# Patient Record
Sex: MALE | HISPANIC OR LATINO | Employment: OTHER | URBAN - NONMETROPOLITAN AREA
[De-identification: names, ages, dates, MRNs, and addresses within clinical notes are randomized per-mention and may not be internally consistent; named-entity substitution may affect disease eponyms.]

---

## 2022-08-22 ENCOUNTER — HOSPITAL ENCOUNTER (INPATIENT)
Facility: HOSPITAL | Age: 57
LOS: 1 days | Discharge: HOME | DRG: 092 | End: 2022-08-24
Attending: EMERGENCY MEDICINE | Admitting: FAMILY MEDICINE
Payer: COMMERCIAL

## 2022-08-22 ENCOUNTER — APPOINTMENT (OUTPATIENT)
Dept: CT IMAGING | Facility: HOSPITAL | Age: 57
DRG: 092 | End: 2022-08-22
Payer: COMMERCIAL

## 2022-08-22 ENCOUNTER — APPOINTMENT (EMERGENCY)
Dept: CT IMAGING | Facility: HOSPITAL | Age: 57
DRG: 092 | End: 2022-08-22
Payer: COMMERCIAL

## 2022-08-22 ENCOUNTER — APPOINTMENT (OUTPATIENT)
Dept: MRI IMAGING | Facility: HOSPITAL | Age: 57
DRG: 092 | End: 2022-08-22
Payer: COMMERCIAL

## 2022-08-22 DIAGNOSIS — I60.9 SAH (SUBARACHNOID HEMORRHAGE) (HCC): ICD-10-CM

## 2022-08-22 DIAGNOSIS — R42 DIZZINESS: ICD-10-CM

## 2022-08-22 DIAGNOSIS — G93.9 CEREBELLAR LESION: ICD-10-CM

## 2022-08-22 DIAGNOSIS — N18.9 CHRONIC KIDNEY DISEASE: ICD-10-CM

## 2022-08-22 DIAGNOSIS — R73.9 HYPERGLYCEMIA: Primary | ICD-10-CM

## 2022-08-22 LAB
ALBUMIN SERPL BCP-MCNC: 3.5 G/DL (ref 3.5–5)
ALP SERPL-CCNC: 107 U/L (ref 46–116)
ALT SERPL W P-5'-P-CCNC: 23 U/L (ref 12–78)
ANION GAP SERPL CALCULATED.3IONS-SCNC: 12 MMOL/L (ref 4–13)
APTT PPP: 27 SECONDS (ref 23–37)
AST SERPL W P-5'-P-CCNC: 13 U/L (ref 5–45)
BASE EX.OXY STD BLDV CALC-SCNC: 97 % (ref 60–80)
BASE EXCESS BLDV CALC-SCNC: -1.4 MMOL/L
BASOPHILS # BLD AUTO: 0.08 THOUSANDS/ΜL (ref 0–0.1)
BASOPHILS NFR BLD AUTO: 1 % (ref 0–1)
BETA-HYDROXYBUTYRATE: 0.1 MMOL/L
BILIRUB SERPL-MCNC: 0.35 MG/DL (ref 0.2–1)
BILIRUB UR QL STRIP: NEGATIVE
BUN SERPL-MCNC: 22 MG/DL (ref 5–25)
CALCIUM SERPL-MCNC: 9.1 MG/DL (ref 8.3–10.1)
CARDIAC TROPONIN I PNL SERPL HS: 10 NG/L
CARDIAC TROPONIN I PNL SERPL HS: 15 NG/L (ref 8–18)
CHLORIDE SERPL-SCNC: 99 MMOL/L (ref 96–108)
CK SERPL-CCNC: 76 U/L (ref 39–308)
CLARITY UR: CLEAR
CO2 SERPL-SCNC: 24 MMOL/L (ref 21–32)
COLOR UR: YELLOW
CREAT SERPL-MCNC: 1.45 MG/DL (ref 0.6–1.3)
EOSINOPHIL # BLD AUTO: 0.69 THOUSAND/ΜL (ref 0–0.61)
EOSINOPHIL NFR BLD AUTO: 5 % (ref 0–6)
ERYTHROCYTE [DISTWIDTH] IN BLOOD BY AUTOMATED COUNT: 15.9 % (ref 11.6–15.1)
GFR SERPL CREATININE-BSD FRML MDRD: 53 ML/MIN/1.73SQ M
GLUCOSE SERPL-MCNC: 137 MG/DL (ref 65–140)
GLUCOSE SERPL-MCNC: 148 MG/DL (ref 65–140)
GLUCOSE SERPL-MCNC: 304 MG/DL (ref 65–140)
GLUCOSE SERPL-MCNC: 342 MG/DL (ref 65–140)
GLUCOSE UR STRIP-MCNC: ABNORMAL MG/DL
HCO3 BLDV-SCNC: 21.6 MMOL/L (ref 24–30)
HCT VFR BLD AUTO: 40.6 % (ref 36.5–49.3)
HGB BLD-MCNC: 12.5 G/DL (ref 12–17)
HGB UR QL STRIP.AUTO: NEGATIVE
IMM GRANULOCYTES # BLD AUTO: 0.08 THOUSAND/UL (ref 0–0.2)
IMM GRANULOCYTES NFR BLD AUTO: 1 % (ref 0–2)
INR PPP: 0.96 (ref 0.84–1.19)
KETONES UR STRIP-MCNC: NEGATIVE MG/DL
LEUKOCYTE ESTERASE UR QL STRIP: NEGATIVE
LYMPHOCYTES # BLD AUTO: 2.02 THOUSANDS/ΜL (ref 0.6–4.47)
LYMPHOCYTES NFR BLD AUTO: 14 % (ref 14–44)
MAGNESIUM SERPL-MCNC: 1.6 MG/DL (ref 1.6–2.6)
MCH RBC QN AUTO: 24.1 PG (ref 26.8–34.3)
MCHC RBC AUTO-ENTMCNC: 30.8 G/DL (ref 31.4–37.4)
MCV RBC AUTO: 78 FL (ref 82–98)
MONOCYTES # BLD AUTO: 0.92 THOUSAND/ΜL (ref 0.17–1.22)
MONOCYTES NFR BLD AUTO: 6 % (ref 4–12)
NEUTROPHILS # BLD AUTO: 10.81 THOUSANDS/ΜL (ref 1.85–7.62)
NEUTS SEG NFR BLD AUTO: 73 % (ref 43–75)
NITRITE UR QL STRIP: NEGATIVE
NRBC BLD AUTO-RTO: 0 /100 WBCS
NT-PROBNP SERPL-MCNC: 127 PG/ML
O2 CT BLDV-SCNC: 17.7 ML/DL
PCO2 BLDV: 31.3 MM HG (ref 42–50)
PH BLDV: 7.46 [PH] (ref 7.3–7.4)
PH UR STRIP.AUTO: 5.5 [PH]
PLATELET # BLD AUTO: 313 THOUSANDS/UL (ref 149–390)
PMV BLD AUTO: 9.6 FL (ref 8.9–12.7)
PO2 BLDV: 154.3 MM HG (ref 35–45)
POTASSIUM SERPL-SCNC: 3.6 MMOL/L (ref 3.5–5.3)
PROT SERPL-MCNC: 7.4 G/DL (ref 6.4–8.4)
PROT UR STRIP-MCNC: NEGATIVE MG/DL
PROTHROMBIN TIME: 12.9 SECONDS (ref 11.6–14.5)
RBC # BLD AUTO: 5.19 MILLION/UL (ref 3.88–5.62)
SODIUM SERPL-SCNC: 135 MMOL/L (ref 135–147)
SP GR UR STRIP.AUTO: 1.02 (ref 1–1.03)
TSH SERPL DL<=0.05 MIU/L-ACNC: 0.55 UIU/ML (ref 0.45–4.5)
UROBILINOGEN UR QL STRIP.AUTO: 0.2 E.U./DL
VIT B12 SERPL-MCNC: 391 PG/ML (ref 100–900)
WBC # BLD AUTO: 14.6 THOUSAND/UL (ref 4.31–10.16)

## 2022-08-22 PROCEDURE — 84484 ASSAY OF TROPONIN QUANT: CPT | Performed by: FAMILY MEDICINE

## 2022-08-22 PROCEDURE — 99220 PR INITIAL OBSERVATION CARE/DAY 70 MINUTES: CPT | Performed by: FAMILY MEDICINE

## 2022-08-22 PROCEDURE — 82010 KETONE BODYS QUAN: CPT | Performed by: EMERGENCY MEDICINE

## 2022-08-22 PROCEDURE — A9585 GADOBUTROL INJECTION: HCPCS | Performed by: FAMILY MEDICINE

## 2022-08-22 PROCEDURE — 83880 ASSAY OF NATRIURETIC PEPTIDE: CPT | Performed by: EMERGENCY MEDICINE

## 2022-08-22 PROCEDURE — 70553 MRI BRAIN STEM W/O & W/DYE: CPT

## 2022-08-22 PROCEDURE — 99285 EMERGENCY DEPT VISIT HI MDM: CPT

## 2022-08-22 PROCEDURE — 82805 BLOOD GASES W/O2 SATURATION: CPT | Performed by: EMERGENCY MEDICINE

## 2022-08-22 PROCEDURE — 82550 ASSAY OF CK (CPK): CPT | Performed by: EMERGENCY MEDICINE

## 2022-08-22 PROCEDURE — 82948 REAGENT STRIP/BLOOD GLUCOSE: CPT

## 2022-08-22 PROCEDURE — 84443 ASSAY THYROID STIM HORMONE: CPT | Performed by: EMERGENCY MEDICINE

## 2022-08-22 PROCEDURE — 36415 COLL VENOUS BLD VENIPUNCTURE: CPT | Performed by: EMERGENCY MEDICINE

## 2022-08-22 PROCEDURE — 96360 HYDRATION IV INFUSION INIT: CPT

## 2022-08-22 PROCEDURE — 85025 COMPLETE CBC W/AUTO DIFF WBC: CPT | Performed by: EMERGENCY MEDICINE

## 2022-08-22 PROCEDURE — 80053 COMPREHEN METABOLIC PANEL: CPT | Performed by: EMERGENCY MEDICINE

## 2022-08-22 PROCEDURE — 99285 EMERGENCY DEPT VISIT HI MDM: CPT | Performed by: EMERGENCY MEDICINE

## 2022-08-22 PROCEDURE — 83735 ASSAY OF MAGNESIUM: CPT | Performed by: EMERGENCY MEDICINE

## 2022-08-22 PROCEDURE — 93005 ELECTROCARDIOGRAM TRACING: CPT

## 2022-08-22 PROCEDURE — 96361 HYDRATE IV INFUSION ADD-ON: CPT

## 2022-08-22 PROCEDURE — 82607 VITAMIN B-12: CPT | Performed by: PHYSICIAN ASSISTANT

## 2022-08-22 PROCEDURE — 70450 CT HEAD/BRAIN W/O DYE: CPT

## 2022-08-22 PROCEDURE — 85730 THROMBOPLASTIN TIME PARTIAL: CPT | Performed by: EMERGENCY MEDICINE

## 2022-08-22 PROCEDURE — 85610 PROTHROMBIN TIME: CPT | Performed by: EMERGENCY MEDICINE

## 2022-08-22 PROCEDURE — 84484 ASSAY OF TROPONIN QUANT: CPT | Performed by: EMERGENCY MEDICINE

## 2022-08-22 PROCEDURE — 70496 CT ANGIOGRAPHY HEAD: CPT

## 2022-08-22 PROCEDURE — 81003 URINALYSIS AUTO W/O SCOPE: CPT | Performed by: FAMILY MEDICINE

## 2022-08-22 RX ORDER — INSULIN LISPRO 100 [IU]/ML
1-6 INJECTION, SOLUTION INTRAVENOUS; SUBCUTANEOUS
Status: DISCONTINUED | OUTPATIENT
Start: 2022-08-22 | End: 2022-08-22

## 2022-08-22 RX ORDER — SENNOSIDES 8.6 MG
1 TABLET ORAL
Status: DISCONTINUED | OUTPATIENT
Start: 2022-08-22 | End: 2022-08-24 | Stop reason: HOSPADM

## 2022-08-22 RX ORDER — CALCIUM CARBONATE 200(500)MG
1000 TABLET,CHEWABLE ORAL DAILY PRN
Status: DISCONTINUED | OUTPATIENT
Start: 2022-08-22 | End: 2022-08-24 | Stop reason: HOSPADM

## 2022-08-22 RX ORDER — INSULIN GLARGINE 100 [IU]/ML
50 INJECTION, SOLUTION SUBCUTANEOUS
Status: DISCONTINUED | OUTPATIENT
Start: 2022-08-23 | End: 2022-08-24 | Stop reason: HOSPADM

## 2022-08-22 RX ORDER — HEPARIN SODIUM 5000 [USP'U]/ML
5000 INJECTION, SOLUTION INTRAVENOUS; SUBCUTANEOUS EVERY 8 HOURS SCHEDULED
Status: DISCONTINUED | OUTPATIENT
Start: 2022-08-22 | End: 2022-08-22

## 2022-08-22 RX ORDER — NICOTINE 21 MG/24HR
1 PATCH, TRANSDERMAL 24 HOURS TRANSDERMAL DAILY
Status: DISCONTINUED | OUTPATIENT
Start: 2022-08-23 | End: 2022-08-24 | Stop reason: HOSPADM

## 2022-08-22 RX ORDER — LOPERAMIDE HYDROCHLORIDE 2 MG/1
2 CAPSULE ORAL 2 TIMES DAILY PRN
Status: DISCONTINUED | OUTPATIENT
Start: 2022-08-22 | End: 2022-08-24 | Stop reason: HOSPADM

## 2022-08-22 RX ORDER — INSULIN GLARGINE 100 [IU]/ML
30 INJECTION, SOLUTION SUBCUTANEOUS ONCE
Status: DISCONTINUED | OUTPATIENT
Start: 2022-08-22 | End: 2022-08-22

## 2022-08-22 RX ORDER — ZOLPIDEM TARTRATE 10 MG/1
10 TABLET ORAL
COMMUNITY

## 2022-08-22 RX ORDER — INSULIN GLARGINE 100 [IU]/ML
50 INJECTION, SOLUTION SUBCUTANEOUS DAILY
COMMUNITY

## 2022-08-22 RX ORDER — METOPROLOL SUCCINATE 100 MG/1
100 TABLET, EXTENDED RELEASE ORAL DAILY
Status: DISCONTINUED | OUTPATIENT
Start: 2022-08-23 | End: 2022-08-24 | Stop reason: HOSPADM

## 2022-08-22 RX ORDER — LISINOPRIL 10 MG/1
10 TABLET ORAL DAILY
COMMUNITY

## 2022-08-22 RX ORDER — LISINOPRIL 10 MG/1
10 TABLET ORAL DAILY
Status: DISCONTINUED | OUTPATIENT
Start: 2022-08-23 | End: 2022-08-24 | Stop reason: HOSPADM

## 2022-08-22 RX ORDER — INSULIN LISPRO 100 [IU]/ML
1-6 INJECTION, SOLUTION INTRAVENOUS; SUBCUTANEOUS
Status: DISCONTINUED | OUTPATIENT
Start: 2022-08-23 | End: 2022-08-24 | Stop reason: HOSPADM

## 2022-08-22 RX ORDER — SODIUM CHLORIDE, SODIUM GLUCONATE, SODIUM ACETATE, POTASSIUM CHLORIDE, MAGNESIUM CHLORIDE, SODIUM PHOSPHATE, DIBASIC, AND POTASSIUM PHOSPHATE .53; .5; .37; .037; .03; .012; .00082 G/100ML; G/100ML; G/100ML; G/100ML; G/100ML; G/100ML; G/100ML
125 INJECTION, SOLUTION INTRAVENOUS CONTINUOUS
Status: DISPENSED | OUTPATIENT
Start: 2022-08-22 | End: 2022-08-22

## 2022-08-22 RX ORDER — METHOCARBAMOL 750 MG/1
750 TABLET, FILM COATED ORAL EVERY 6 HOURS PRN
COMMUNITY

## 2022-08-22 RX ORDER — AMLODIPINE BESYLATE 10 MG/1
10 TABLET ORAL DAILY
Status: DISCONTINUED | OUTPATIENT
Start: 2022-08-23 | End: 2022-08-24 | Stop reason: HOSPADM

## 2022-08-22 RX ORDER — MAGNESIUM SULFATE 1 G/100ML
1 INJECTION INTRAVENOUS ONCE
Status: COMPLETED | OUTPATIENT
Start: 2022-08-22 | End: 2022-08-22

## 2022-08-22 RX ORDER — HYDROMORPHONE HCL/PF 1 MG/ML
0.5 SYRINGE (ML) INJECTION EVERY 4 HOURS PRN
Status: DISCONTINUED | OUTPATIENT
Start: 2022-08-22 | End: 2022-08-24 | Stop reason: HOSPADM

## 2022-08-22 RX ORDER — MECLIZINE HYDROCHLORIDE 25 MG/1
25 TABLET ORAL EVERY 8 HOURS SCHEDULED
Status: DISCONTINUED | OUTPATIENT
Start: 2022-08-22 | End: 2022-08-22

## 2022-08-22 RX ORDER — METHOCARBAMOL 750 MG/1
750 TABLET, FILM COATED ORAL EVERY 6 HOURS PRN
Status: DISCONTINUED | OUTPATIENT
Start: 2022-08-22 | End: 2022-08-24 | Stop reason: HOSPADM

## 2022-08-22 RX ORDER — LIDOCAINE 50 MG/G
1 PATCH TOPICAL DAILY
Status: DISCONTINUED | OUTPATIENT
Start: 2022-08-22 | End: 2022-08-24 | Stop reason: HOSPADM

## 2022-08-22 RX ORDER — CLOPIDOGREL BISULFATE 75 MG/1
75 TABLET ORAL DAILY
COMMUNITY

## 2022-08-22 RX ORDER — ATORVASTATIN CALCIUM 40 MG/1
80 TABLET, FILM COATED ORAL
Status: DISCONTINUED | OUTPATIENT
Start: 2022-08-22 | End: 2022-08-24 | Stop reason: HOSPADM

## 2022-08-22 RX ORDER — MECLIZINE HYDROCHLORIDE 25 MG/1
25 TABLET ORAL EVERY 8 HOURS PRN
Status: DISCONTINUED | OUTPATIENT
Start: 2022-08-22 | End: 2022-08-24 | Stop reason: HOSPADM

## 2022-08-22 RX ORDER — INSULIN LISPRO 100 [IU]/ML
1-6 INJECTION, SOLUTION INTRAVENOUS; SUBCUTANEOUS
Status: DISCONTINUED | OUTPATIENT
Start: 2022-08-22 | End: 2022-08-24 | Stop reason: HOSPADM

## 2022-08-22 RX ORDER — ATORVASTATIN CALCIUM 80 MG/1
80 TABLET, FILM COATED ORAL DAILY
COMMUNITY

## 2022-08-22 RX ORDER — NITROGLYCERIN 0.4 MG/1
0.4 TABLET SUBLINGUAL
Status: DISCONTINUED | OUTPATIENT
Start: 2022-08-22 | End: 2022-08-22

## 2022-08-22 RX ORDER — LORAZEPAM 2 MG/ML
1 INJECTION INTRAMUSCULAR ONCE
Status: COMPLETED | OUTPATIENT
Start: 2022-08-22 | End: 2022-08-22

## 2022-08-22 RX ORDER — OXYCODONE HYDROCHLORIDE AND ACETAMINOPHEN 5; 325 MG/1; MG/1
1 TABLET ORAL EVERY 6 HOURS PRN
COMMUNITY
End: 2022-08-22 | Stop reason: ALTCHOICE

## 2022-08-22 RX ORDER — ZOLPIDEM TARTRATE 5 MG/1
10 TABLET ORAL
Status: DISCONTINUED | OUTPATIENT
Start: 2022-08-22 | End: 2022-08-24 | Stop reason: HOSPADM

## 2022-08-22 RX ORDER — AMLODIPINE BESYLATE 10 MG/1
10 TABLET ORAL DAILY
COMMUNITY

## 2022-08-22 RX ORDER — METOPROLOL SUCCINATE 100 MG/1
100 TABLET, EXTENDED RELEASE ORAL DAILY
COMMUNITY

## 2022-08-22 RX ORDER — ONDANSETRON 2 MG/ML
4 INJECTION INTRAMUSCULAR; INTRAVENOUS EVERY 6 HOURS PRN
Status: DISCONTINUED | OUTPATIENT
Start: 2022-08-22 | End: 2022-08-24 | Stop reason: HOSPADM

## 2022-08-22 RX ORDER — ACETAMINOPHEN 325 MG/1
650 TABLET ORAL EVERY 6 HOURS PRN
Status: DISCONTINUED | OUTPATIENT
Start: 2022-08-22 | End: 2022-08-24 | Stop reason: HOSPADM

## 2022-08-22 RX ORDER — CLOPIDOGREL BISULFATE 75 MG/1
75 TABLET ORAL DAILY
Status: DISCONTINUED | OUTPATIENT
Start: 2022-08-23 | End: 2022-08-22

## 2022-08-22 RX ORDER — NITROGLYCERIN 0.4 MG/1
0.4 TABLET SUBLINGUAL
COMMUNITY

## 2022-08-22 RX ADMIN — IOHEXOL 85 ML: 350 INJECTION, SOLUTION INTRAVENOUS at 16:15

## 2022-08-22 RX ADMIN — ACETAMINOPHEN 650 MG: 325 TABLET ORAL at 22:46

## 2022-08-22 RX ADMIN — GADOBUTROL 11 ML: 604.72 INJECTION INTRAVENOUS at 15:32

## 2022-08-22 RX ADMIN — SODIUM CHLORIDE, SODIUM GLUCONATE, SODIUM ACETATE, POTASSIUM CHLORIDE, MAGNESIUM CHLORIDE, SODIUM PHOSPHATE, DIBASIC, AND POTASSIUM PHOSPHATE 125 ML/HR: .53; .5; .37; .037; .03; .012; .00082 INJECTION, SOLUTION INTRAVENOUS at 17:49

## 2022-08-22 RX ADMIN — LIDOCAINE 5% 1 PATCH: 700 PATCH TOPICAL at 17:49

## 2022-08-22 RX ADMIN — ATORVASTATIN CALCIUM 80 MG: 40 TABLET, FILM COATED ORAL at 22:59

## 2022-08-22 RX ADMIN — MAGNESIUM SULFATE HEPTAHYDRATE 1 G: 1 INJECTION, SOLUTION INTRAVENOUS at 17:46

## 2022-08-22 RX ADMIN — LORAZEPAM 1 MG: 2 INJECTION INTRAMUSCULAR; INTRAVENOUS at 15:17

## 2022-08-22 RX ADMIN — SODIUM CHLORIDE 1000 ML: 0.9 INJECTION, SOLUTION INTRAVENOUS at 13:29

## 2022-08-22 RX ADMIN — INSULIN HUMAN 8 UNITS: 100 INJECTION, SOLUTION PARENTERAL at 15:33

## 2022-08-22 NOTE — ASSESSMENT & PLAN NOTE
Lab Results   Component Value Date    HGBA1C 9 8 (H) 11/30/2021       Recent Labs     08/22/22  1329   POCGLU 304*       Blood Sugar Average: Last 72 hrs:  (P) 304     · Home regimen: Lantus 50 u qam, Metformin 1000 mg bid  · IP regimen: Lantus 50 u qam & Lispro ssi achs, ADA diet

## 2022-08-22 NOTE — ASSESSMENT & PLAN NOTE
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

## 2022-08-22 NOTE — ASSESSMENT & PLAN NOTE
· H/o LBP with h/o cervical disc herniation and lumbar disc herniation  · Tylenol prn, Lidoderm patch prn, robaxin prn

## 2022-08-22 NOTE — ASSESSMENT & PLAN NOTE
· H/o 4 stents  · Cont BB and Plavix, statin and antihypertensive regimen  · Trop wnl and EKG without abnormality on admission  · Patient complaining of 2 week history of chest pain with radiation to the left arm and numbness which also has fatigue, diaphoresis and this has been daily occurrence  Hasn't tried nitro at home for this     · Needs stress test once [facility] is addressed  · Will keep on tele  · EKG with any chest pain episodes

## 2022-08-22 NOTE — PLAN OF CARE
Problem: Potential for Falls  Goal: Patient will remain free of falls  Description: INTERVENTIONS:  - Educate patient/family on patient safety including physical limitations  - Instruct patient to call for assistance with activity   - Consult OT/PT to assist with strengthening/mobility   - Keep Call bell within reach  - Keep bed low and locked with side rails adjusted as appropriate  - Keep care items and personal belongings within reach  - Initiate and maintain comfort rounds  - Make Fall Risk Sign visible to staff  - Offer Toileting every  Hours, in advance of need  - Initiate/Maintain alarm  - Obtain necessary fall risk management equipment:   - Apply yellow socks and bracelet for high fall risk patients  - Consider moving patient to room near nurses station  Outcome: Progressing     Problem: PAIN - ADULT  Goal: Verbalizes/displays adequate comfort level or baseline comfort level  Description: Interventions:  - Encourage patient to monitor pain and request assistance  - Assess pain using appropriate pain scale  - Administer analgesics based on type and severity of pain and evaluate response  - Implement non-pharmacological measures as appropriate and evaluate response  - Consider cultural and social influences on pain and pain management  - Notify physician/advanced practitioner if interventions unsuccessful or patient reports new pain  Outcome: Progressing     Problem: INFECTION - ADULT  Goal: Absence or prevention of progression during hospitalization  Description: INTERVENTIONS:  - Assess and monitor for signs and symptoms of infection  - Monitor lab/diagnostic results  - Monitor all insertion sites, i e  indwelling lines, tubes, and drains  - Monitor endotracheal if appropriate and nasal secretions for changes in amount and color  - [place] appropriate cooling/warming therapies per order  - Administer medications as ordered  - Instruct and encourage patient and family to use good hand hygiene technique  - Identify and instruct in appropriate isolation precautions for identified infection/condition  Outcome: Progressing  Goal: Absence of fever/infection during neutropenic period  Description: INTERVENTIONS:  - Monitor WBC    Outcome: Progressing     Problem: SAFETY ADULT  Goal: Patient will remain free of falls  Description: INTERVENTIONS:  - Educate patient/family on patient safety including physical limitations  - Instruct patient to call for assistance with activity   - Consult OT/PT to assist with strengthening/mobility   - Keep Call bell within reach  - Keep bed low and locked with side rails adjusted as appropriate  - Keep care items and personal belongings within reach  - Initiate and maintain comfort rounds  - Make Fall Risk Sign visible to staff  - Offer Toileting every  Hours, in advance of need  - Initiate/Maintain alarm  - Obtain necessary fall risk management equipment:   - Apply yellow socks and bracelet for high fall risk patients  - Consider moving patient to room near nurses station  Outcome: Progressing  Goal: Maintain or return to baseline ADL function  Description: INTERVENTIONS:  -  Assess patient's ability to carry out ADLs; assess patient's baseline for ADL function and identify physical deficits which impact ability to perform ADLs (bathing, care of mouth/teeth, toileting, grooming, dressing, etc )  - Assess/evaluate cause of self-care deficits   - Assess range of motion  - Assess patient's mobility; develop plan if impaired  - Assess patient's need for assistive devices and provide as appropriate  - Encourage maximum independence but intervene and supervise when necessary  - Involve family in performance of ADLs  - Assess for home care needs following discharge   - Consider OT consult to assist with ADL evaluation and planning for discharge  - Provide patient education as appropriate  Outcome: Progressing  Goal: Maintains/Returns to pre admission functional level  Description: INTERVENTIONS:  - Perform BMAT or MOVE assessment daily    - Set and communicate daily mobility goal to care team and patient/family/caregiver  - Collaborate with rehabilitation services on mobility goals if consulted  - Perform Range of Motion  times a day  - Reposition patient every  hours  - Dangle patient  times a day  - Stand patient  times a day  - Ambulate patient  times a day  - Out of bed to chair  times a day   - Out of bed for meals  times a day  - Out of bed for toileting  - Record patient progress and toleration of activity level   Outcome: Progressing     Problem: DISCHARGE PLANNING  Goal: Discharge to home or other facility with appropriate resources  Description: INTERVENTIONS:  - Identify barriers to discharge w/patient and caregiver  - Arrange for needed discharge resources and transportation as appropriate  - Identify discharge learning needs (meds, wound care, etc )  - Arrange for interpretive services to assist at discharge as needed  - Refer to Case Management Department for coordinating discharge planning if the patient needs post-hospital services based on physician/advanced practitioner order or complex needs related to functional status, cognitive ability, or social support system  Outcome: Progressing     Problem: Knowledge Deficit  Goal: Patient/family/caregiver demonstrates understanding of disease process, treatment plan, medications, and discharge instructions  Description: Complete learning assessment and assess knowledge base    Interventions:  - Provide teaching at level of understanding  - Provide teaching via preferred learning methods  Outcome: Progressing

## 2022-08-22 NOTE — ASSESSMENT & PLAN NOTE
· H/o 4 stents  · Cont BB and statin and antihypertensive regimen  · Hold plavix in [facility], await NS recs on this  · Trop wnl and EKG without abnormality on admission  · Patient complaining of 2 week history of chest pain with radiation to the left arm and numbness which also has fatigue, diaphoresis and this has been daily occurrence  Hasn't tried nitro at home for this     · Needs stress test once [facility] is addressed  · Will keep on tele  · EKG with any chest pain episodes

## 2022-08-22 NOTE — ASSESSMENT & PLAN NOTE
Lab Results   Component Value Date    EGFR 53 08/22/2022    CREATININE 1 45 (H) 08/22/2022     · CKD baseline Cr 1 3, not technically an KELVIN right now  · Cont home meds  · Other labs with possibly dehydration will give slight IVF and monitor BMP tomorrow am

## 2022-08-22 NOTE — ED PROVIDER NOTES
History  Chief Complaint   Patient presents with    Weakness - Generalized     Pt reports "imbalance" for two weeks  Reports pain/numbness in L arm  Strength equal bilaterally   Chest Pain     Pt reports intermittent L sided CP for three days, SOB      Patient is a 69-year-old male with history of coronary artery disease prior stents presents emergency department complaining of dizziness described as being off balance when walking and pain in the left arm and occasional left-sided chest pain symptoms started about 2 weeks ago still present worsening  No slurred speech or facial asymmetry no focal numbness or weakness is present on examination in the ED  Patient describes the left arm symptoms as paresthesia like and pain  Patient did also have some neck pain and discomfort about 2 weeks ago  History provided by:  Patient  Chest Pain  Pain location:  L chest  Pain severity:  Mild  Onset quality:  Gradual  Duration:  3 days  Timing:  Intermittent  Progression:  Waxing and waning  Chronicity:  Recurrent  Associated symptoms: dizziness, numbness and shortness of breath    Associated symptoms: no abdominal pain, no cough, no fatigue, no fever, no headache, no nausea, no palpitations, not vomiting and no weakness        Prior to Admission Medications   Prescriptions Last Dose Informant Patient Reported? Taking?    amLODIPine (NORVASC) 10 mg tablet   Yes Yes   Sig: Take 10 mg by mouth daily   atorvastatin (LIPITOR) 80 mg tablet   Yes Yes   Sig: Take 80 mg by mouth daily   clopidogrel (PLAVIX) 75 mg tablet   Yes Yes   Sig: Take 75 mg by mouth daily   insulin glargine (LANTUS) 100 units/mL subcutaneous injection   Yes Yes   Sig: Inject 50 Units under the skin daily   lisinopril (ZESTRIL) 10 mg tablet   Yes Yes   Sig: Take 10 mg by mouth daily   metFORMIN (GLUCOPHAGE) 1000 MG tablet   Yes Yes   Sig: Take 1,000 mg by mouth 2 (two) times a day with meals   methocarbamol (ROBAXIN) 750 mg tablet   Yes Yes   Sig: Take 750 mg by mouth every 6 (six) hours as needed for muscle spasms   metoprolol succinate (TOPROL-XL) 100 mg 24 hr tablet   Yes Yes   Sig: Take 100 mg by mouth daily   nitroglycerin (NITROSTAT) 0 4 mg SL tablet   Yes Yes   Sig: Place 0 4 mg under the tongue every 5 (five) minutes as needed for chest pain   zolpidem (Ambien) 10 mg tablet   Yes Yes   Sig: Take 10 mg by mouth daily at bedtime as needed for sleep      Facility-Administered Medications: None       Past Medical History:   Diagnosis Date    Diabetes mellitus ([facility] [address] )     Hypertension        History reviewed  No pertinent surgical history  History reviewed  No pertinent family history  I have reviewed and agree with the history as documented  E-Cigarette/Vaping    E-Cigarette Use Never User      E-Cigarette/Vaping Substances     Social History     Tobacco Use    Smoking status: Current Some Day Smoker     Packs/day: 0 25    Smokeless tobacco: Never Used   Vaping Use    Vaping Use: Never used   Substance Use Topics    Alcohol use: Not Currently    Drug use: Never       Review of Systems   Constitutional: Negative for activity change, appetite change, chills, fatigue and fever  HENT: Negative for congestion, ear pain, rhinorrhea and sore throat  Eyes: Negative for discharge, redness and visual disturbance  Intermittent blurry vision   Respiratory: Positive for chest tightness and shortness of breath  Negative for cough and wheezing  Cardiovascular: Positive for chest pain  Negative for palpitations  Gastrointestinal: Negative for abdominal pain, constipation, diarrhea, nausea and vomiting  Endocrine: Negative for polydipsia and polyuria  Genitourinary: Negative for difficulty urinating, dysuria, frequency, hematuria and urgency  Musculoskeletal: Negative for arthralgias and myalgias  Left arm pain   Skin: Negative for color change, pallor and rash     Neurological: Positive for dizziness, light-headedness and numbness  Negative for facial asymmetry, speech difficulty, weakness and headaches  Hematological: Negative for adenopathy  Does not bruise/bleed easily  All other systems reviewed and are negative  Physical Exam  Physical Exam  Vitals and nursing note reviewed  Constitutional:       Appearance: He is well-developed  HENT:      Head: Normocephalic and atraumatic  Right Ear: External ear normal       Left Ear: External ear normal       Nose: Nose normal    Eyes:      Conjunctiva/sclera: Conjunctivae normal       Pupils: Pupils are equal, round, and reactive to light  Cardiovascular:      Rate and Rhythm: Normal rate and regular rhythm  Heart sounds: Normal heart sounds  Pulmonary:      Effort: Pulmonary effort is normal  No respiratory distress  Breath sounds: Normal breath sounds  No wheezing or rales  Chest:      Chest wall: No tenderness  Abdominal:      General: Bowel sounds are normal  There is no distension  Palpations: Abdomen is soft  Tenderness: There is no abdominal tenderness  There is no guarding  Musculoskeletal:         General: Normal range of motion  Cervical back: Normal range of motion and neck supple  Skin:     General: Skin is warm and dry  Neurological:      Mental Status: He is alert and oriented to person, place, and time  Cranial Nerves: No cranial nerve deficit  Sensory: No sensory deficit           Vital Signs  ED Triage Vitals   Temperature Pulse Respirations Blood Pressure SpO2   08/22/22 1319 08/22/22 1319 08/22/22 1319 08/22/22 1319 08/22/22 1319   98 °F (36 7 °C) 77 16 145/82 99 %      Temp src Heart Rate Source Patient Position - Orthostatic VS BP Location FiO2 (%)   -- 08/22/22 1430 08/22/22 1319 08/22/22 1319 --    Monitor Lying Right arm       Pain Score       08/22/22 1319       5           Vitals:    08/22/22 1319 08/22/22 1415 08/22/22 1430   BP: 145/82 148/65 152/70   Pulse: 77 69 69   Patient Position - Orthostatic VS: Lying  Lying         Visual Acuity  Visual Acuity    Flowsheet Row Most Recent Value   L Pupil Size (mm) 2   R Pupil Size (mm) 2          ED Medications  Medications   insulin regular (HumuLIN R,NovoLIN R) injection 8 Units (has no administration in time range)   sodium chloride 0 9 % bolus 1,000 mL (has no administration in time range)   sodium chloride 0 9 % bolus 1,000 mL (1,000 mL Intravenous New Bag 8/22/22 1329)   LORazepam (ATIVAN) injection 1 mg (1 mg Intravenous Given 8/22/22 1517)       Diagnostic Studies  Results Reviewed     Procedure Component Value Units Date/Time    TSH [[id number]]  (Normal) Collected: 08/22/22 1327    Lab Status: Final result Specimen: Blood from Arm, Left Updated: 08/22/22 1412     TSH 3RD GENERATON 0 551 uIU/mL     Narrative:      Patients undergoing fluorescein dye angiography may retain small amounts of fluorescein in the body for 48-72 hours post procedure  Samples containing fluorescein can produce falsely depressed TSH values  If the patient had this procedure,a specimen should be resubmitted post fluorescein clearance        Magnesium [[id number]]  (Normal) Collected: 08/22/22 1327    Lab Status: Final result Specimen: Blood from Arm, Left Updated: 08/22/22 1410     Magnesium 1 6 mg/dL     CK Total with Reflex CKMB [[id number]]  (Normal) Collected: 08/22/22 1327    Lab Status: Final result Specimen: Blood from Arm, Left Updated: 08/22/22 1410     Total CK 76 U/L     NT-BNP PRO [[id number]]  (Abnormal) Collected: 08/22/22 1327    Lab Status: Final result Specimen: Blood from Arm, Left Updated: 08/22/22 1410     NT-proBNP 127 pg/mL     Comprehensive metabolic panel [[id number]]  (Abnormal) Collected: 08/22/22 1327    Lab Status: Final result Specimen: Blood from Arm, Left Updated: 08/22/22 1406     Sodium 135 mmol/L      Potassium 3 6 mmol/L      Chloride 99 mmol/L      CO2 24 mmol/L      ANION GAP 12 mmol/L      BUN 22 mg/dL      Creatinine 1 45 mg/dL      Glucose 342 mg/dL      Calcium 9 1 mg/dL      AST 13 U/L      ALT 23 U/L      Alkaline Phosphatase 107 U/L      Total Protein 7 4 g/dL      Albumin 3 5 g/dL      Total Bilirubin 0 35 mg/dL      eGFR 53 ml/min/1 73sq m     Narrative:      [place] guidelines for Chronic Kidney Disease (CKD):     Stage 1 with normal or high GFR (GFR > 90 mL/min/1 73 square meters)    Stage 2 Mild CKD (GFR = 60-89 mL/min/1 73 square meters)    Stage 3A Moderate CKD (GFR = 45-59 mL/min/1 73 square meters)    Stage 3B Moderate CKD (GFR = 30-44 mL/min/1 73 square meters)    Stage 4 Severe CKD (GFR = 15-29 mL/min/1 73 square meters)    Stage 5 End Stage CKD (GFR <15 mL/min/1 73 square meters)  Note: GFR calculation is accurate only with a steady state creatinine    HS Troponin 0hr (reflex protocol) [[id number]]  (Normal) Collected: 08/22/22 1327    Lab Status: Final result Specimen: Blood from Arm, Left Updated: 08/22/22 1403     hs TnI 0hr 10 ng/L     Beta Hydroxybutyrate [[id number]]  (Normal) Collected: 08/22/22 1344    Lab Status: Final result Specimen: Blood from Arm, Left Updated: 08/22/22 1356     BETA-HYDROXYBUTYRATE 0 1 mmol/L     Blood gas, venous [[id number]]  (Abnormal) Collected: 08/22/22 1344    Lab Status: Final result Specimen: Blood from Arm, Left Updated: 08/22/22 1353     pH, [place] 7 457     pCO2, [place] 31 3 mm Hg      pO2, [place] 154 3 mm Hg      HCO3, [place] 21 6 mmol/L      Base Excess, [place] -1 4 mmol/L      O2 Content, [place] 17 7 ml/dL      O2 HGB, VENOUS 97 0 %     Protime-INR [[id number]]  (Normal) Collected: 08/22/22 1327    Lab Status: Final result Specimen: Blood from Arm, Left Updated: 08/22/22 1351     Protime 12 9 seconds      INR 0 96    APTT [[id number]]  (Normal) Collected: 08/22/22 1327    Lab Status: Final result Specimen: Blood from Arm, Left Updated: 08/22/22 1351     PTT 27 seconds     CBC and differential [[id number]]  (Abnormal) Collected: 08/22/22 1327    Lab Status: Final result Specimen: Blood from Arm, Left Updated: 08/22/22 1338     WBC 14 60 Thousand/uL      RBC 5 19 Million/uL      Hemoglobin 12 5 g/dL      Hematocrit 40 6 %      MCV 78 fL      MCH 24 1 pg      MCHC 30 8 g/dL      RDW 15 9 %      MPV 9 6 fL      Platelets 954 Thousands/uL      nRBC 0 /100 WBCs      Neutrophils Relative 73 %      Immat GRANS % 1 %      Lymphocytes Relative 14 %      Monocytes Relative 6 %      Eosinophils Relative 5 %      Basophils Relative 1 %      Neutrophils Absolute 10 81 Thousands/µL      Immature Grans Absolute 0 08 Thousand/uL      Lymphocytes Absolute 2 02 Thousands/µL      Monocytes Absolute 0 92 Thousand/µL      Eosinophils Absolute 0 69 Thousand/µL      Basophils Absolute 0 08 Thousands/µL     Fingerstick Glucose (POCT) [[id number]]  (Abnormal) Collected: 08/22/22 1329    Lab Status: Final result Updated: 08/22/22 1332     POC Glucose 304 mg/dl     UA w Reflex to Microscopic w Reflex to Culture [[id number]]     Lab Status: No result Specimen: Urine                  CT head without contrast   Final Result by Shania Collazo MD (08/22 1419)      Vague focus of hyperdensity in the inferior cerebellar vermis as above  Further evaluation with MRI without and with intravenous gadolinium is advised  I personally discussed this study with Justus Lamb on 8/22/2022 at 2:18 PM                            Workstation performed: [id number]         CTA head w wo contrast    (Results Pending)   MRI brain w wo contrast    (Results Pending)              Procedures  ECG 12 Lead Documentation Only    Date/Time: 8/22/2022 1:32 PM  Performed by: Adria Karimi DO  Authorized by:  Adria Karimi DO     ECG reviewed by me, the ED Provider: yes    Patient location:  ED  Previous ECG:     Comparison to cardiac monitor: Yes    Quality:     Tracing quality:  Limited by artifact  Rate:     ECG rate:  71    ECG rate assessment: normal    Rhythm:     Rhythm: sinus rhythm    QRS:     QRS axis:  Normal    QRS intervals: Normal  Conduction:     Conduction: normal    ST segments:     ST segments:  Non-specific  T waves:     T waves: non-specific               ED Course  ED Course as of 08/22/22 1525   Mon Aug 22, 2022   1426 Spoke with Dr Marissa Panchal neuro surgery on-call reviewed case and findings in the emergency department and CT imaging he will review CT images himself and call back with advice on patient disposition  18 Dr Marissa Panchal called back and reports that the patient does not require transfer for surgical intervention or interventional radiology states that there may be some prior hemorrhage in the lesion but suspects that this is a cavernoma recommends obtaining CTA of brain and MRI brain with and without and felt that patient can be admitted to medical service here and would not require transfer for neurosurgical services  5 Spoke with Dr Uziel Duarte hospitalist on-call reviewed case and findings in the emergency department and neuro surgery recommendations he accepts for observation  HEART Risk Score    Flowsheet Row Most Recent Value   Heart Score Risk Calculator    History 0 Filed at: 08/22/2022 1410   ECG 1 Filed at: 08/22/2022 1410   Age 1 Filed at: 08/22/2022 1410   Risk Factors 2 Filed at: 08/22/2022 1410   Troponin 0 Filed at: 08/22/2022 1410   HEART Score 4 Filed at: 08/22/2022 1410                        SBIRT 20yo+    Flowsheet Row Most Recent Value   SBIRT (25 yo +)    In order to provide better care to our patients, we are screening all of our patients for alcohol and drug use  Would it be okay to ask you these screening questions? Yes Filed at: 08/22/2022 1331   Initial Alcohol Screen: US AUDIT-C     1  How often do you have a drink containing alcohol? 0 Filed at: 08/22/2022 1331   2  How many drinks containing alcohol do you have on a typical day you are drinking? 0 Filed at: 08/22/2022 1331   3a  Male UNDER 65: How often do you have five or more drinks on one occasion?  0 Filed at: 08/22/2022 1331   3b  FEMALE Any Age, or MALE 65+: How often do you have 4 or more drinks on one occassion? 0 Filed at: 08/22/2022 1331   Audit-C Score 0 Filed at: 08/22/2022 1331   LIDIA: How many times in the past year have you    Used an illegal drug or used a prescription medication for non-medical reasons? Never Filed at: 08/22/2022 1331                    MDM  Number of Diagnoses or Management Options  Cerebellar lesion: new and requires workup  Chronic kidney disease: new and requires workup  Dizziness: new and requires workup  Hyperglycemia: new and requires workup     Amount and/or Complexity of Data Reviewed  Clinical lab tests: ordered and reviewed  Tests in the radiology section of CPT®: reviewed and ordered  Tests in the medicine section of CPT®: ordered and reviewed  Decide to obtain previous medical records or to obtain history from someone other than the patient: yes  Review and summarize past medical records: yes  Independent visualization of images, tracings, or specimens: yes    Risk of Complications, Morbidity, and/or Mortality  Presenting problems: moderate  Diagnostic procedures: moderate  Management options: moderate    Patient Progress  Patient progress: stable      Disposition  Final diagnoses:   Hyperglycemia   Chronic kidney disease   Cerebellar lesion - There is a vague focus of hyperdensity in the inferior cerebellar vermis as seen on series 2, image 11  There is no significant mass effect or edema    Differential considerations would include subacute intraparenchymal hemorrhage,   Dizziness     Time reflects when diagnosis was documented in both MDM as applicable and the Disposition within this note     Time User Action Codes Description Comment    8/22/2022  2:09 PM Logan Cyr Add [R73 9] Hyperglycemia     8/22/2022  2:09 PM Logan Cyr Add [N18 9] Chronic kidney disease     8/22/2022  2:23 PM Logan Cyr Add [G93 9] Cerebellar lesion     8/22/2022  2:24 PM Logan Cyr Modify [G93 9] Cerebellar lesion There is a vague focus of hyperdensity in the inferior cerebellar vermis as seen on series 2, image 11  There is no significant mass effect or edema  Differential considerations would include subacute intraparenchymal hemorrhage,    8/22/2022  3:25 PM Jass El Add [R42] Dizziness       ED Disposition     ED Disposition   Admit    Condition   Stable    Date/Time   Mon Aug 22, 2022  3:24 PM    Comment   Case was discussed with Dr Carol Caballero and the patient's admission status was agreed to be Admission Status: observation status to the service of Dr Carol Bolaños    None         Patient's Medications   Discharge Prescriptions    No medications on file       No discharge procedures on file      PDMP Review       Value Time User    PDMP Reviewed  Yes 8/22/2022  3:25 PM USAMA [place]          ED Provider  Electronically Signed by           Debra Ramon DO  08/22/22 2958

## 2022-08-22 NOTE — H&P
Prasad 41 1965, 62 y o  male MRN: [id number]  Unit/Bed#: -Keisha Encounter: [id number]  Primary Care Provider: José Miguel Posey PA-C   Date and time admitted to hospital: 8/22/2022  1:23 PM    [facility] (subarachnoid hemorrhage) (HCC)  Assessment & Plan  · CTH with lesion in cerebellum  · CTA H&N await rad read  · MRI brain with cerebellum vermis subacute and acute subarachnoid hemorrhage - I messaged NS again regarding this and await response  · No trauma per patient  · Neuro exam non focal, GCS 15, VSS  There is cervical tenderness but full ROM of cspine  · Fall risk - PT/OT evals ordered  H/o PUD (peptic ulcer disease)  Assessment & Plan  · H/o gastrostomy and ulcer repair in the past  · Tums prn    Stage 3a chronic kidney disease ([facility] [address] )  Assessment & Plan  Lab Results   Component Value Date    EGFR 53 08/22/2022    CREATININE 1 45 (H) 08/22/2022     · CKD baseline Cr 1 3, not technically an KELVIN right now  · Cont home meds  · Other labs with possibly dehydration will give slight IVF and monitor BMP tomorrow am    CAD  Assessment & Plan  · H/o 4 stents  · Cont BB and Plavix, statin and antihypertensive regimen  · Trop wnl and EKG without abnormality on admission  · Patient complaining of 2 week history of chest pain with radiation to the left arm and numbness which also has fatigue, diaphoresis and this has been daily occurrence  Hasn't tried nitro at home for this     · Needs stress test once [facility] is addressed  · Will keep on tele  · EKG with any chest pain episodes    Low back pain  Assessment & Plan  · H/o LBP with h/o cervical disc herniation and lumbar disc herniation  · Tylenol prn, Lidoderm patch prn, robaxin prn    Type 2 diabetes mellitus with hyperglycemia, with long-term current use of insulin [facility])  Assessment & Plan  Lab Results   Component Value Date    HGBA1C 9 8 (H) 11/30/2021       Recent Labs     08/22/22  1329   POCGLU 304*       Blood Sugar Average: Last 72 hrs:  (P) 304     · Home regimen: Lantus 50 u qam, Metformin 1000 mg bid  · IP regimen: Lantus 50 u qam & Lispro ssi achs, ADA diet  Essential hypertension  Assessment & Plan  · Cont home meds    VTE Pharmacologic Prophylaxis: VTE Score: 6 High Risk (Score >/= 5) - Pharmacological DVT Prophylaxis Ordered: heparin  Sequential Compression Devices Ordered  Code Status: Level 1 - full code  Discussion with family: Patient declined call to   He says he will call family  Anticipated Length of Stay: Patient will be admitted on an observation basis with an anticipated length of stay of less than 2 midnights secondary to imaging, monitoring sxs, ivf and trending labs tomorrow  Total Time for Visit, including Counseling / Coordination of Care: 45 minutes Greater than 50% of this total time spent on direct patient counseling and coordination of care  Chief Complaint: dizziness with left chest pain, left neck pain and left arm numbness x 2 weeks    History of Present Illness:  Sissy Nichols is a 62 y o  male with a PMH of Cad sp 4 stents, HTN, LBP, herniated cervical disc & lumbar discs, T2DM on insulin uncontrolled with CKD 3,  PUD who presents with dizziness x 2 weeks  Patient describes dizziness x 2 or 3 weeks, no precipitating event, trauma or injury, gradual onset, happens now daily all day, "only time I am not dizzy is when I am asleep " Worse with moving his head so there are floaters in his vision and diplopia when he moves his head quickly  Tried antivert from a friend which helped slightly but didn't resolve it  Never experienced this before  Also assc with occipitalis location back of the neck headache which is tender to touch and happens daily causing frontal tension headache which he doesn't take tylenol for just works its course and resolves with time during the day  No tinnitus  He has hearing loss from the service in the left ear   There is sinus congestion stuffy nose x 2 weeks, afebrile  No sore throat  There is chronic cough x 2 weeks not worsening (had it ever since covid infection 2 years ago)  Patient also describes almost daily chest pain left side, with exertion, radiation to the left arm with numbness and weakness of the left arm  Improves with resting about an hour  Hasn't tried his nitro for it  He doesn't usually take nitro  This feels like when he had his stents placed in the heart and he is "Worried one of the stents is blocked " During these episodes he has fatigue and diaphoresis  No pleurisy  Patient describes as well right leg pain "sharp" intermittent and numbness in the thigh and calf and toes which he thinks is also assc with atrophy of that leg but I don't appreciate it  No edema or wounds in the leg  No discoloration or temperature difference between legs  Not assc with exertion or worse depending on position  It is random  Patient has been compliant with all medications except insulin which he hasn't taken for months  No recreational drug use  He smokes  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 35 year old daughter  Patient will be admitted for dizziness, chest pain and hyperglycemia  Review of Systems:  Review of Systems   Constitutional: Positive for diaphoresis and fatigue  HENT: Positive for congestion  Negative for sore throat, tinnitus, trouble swallowing and voice change  Eyes: Positive for visual disturbance  + floaters when he turns his head quickly   Respiratory: Negative  Cardiovascular: Positive for chest pain  Negative for palpitations and leg swelling  Gastrointestinal: Negative  Endocrine: Negative  Genitourinary: Negative  Musculoskeletal: Positive for back pain and neck pain  Skin: Negative  Allergic/Immunologic: Positive for immunocompromised state  T2dm, smoker   Neurological: Positive for dizziness, numbness and headaches   Negative for tremors, seizures, syncope, facial asymmetry, speech difficulty and light-headedness  Hematological: Negative  Psychiatric/Behavioral: Negative  Past Medical and Surgical History:   Past Medical History:   Diagnosis Date    Diabetes mellitus ([facility] [address] )     Hypertension        Surgical history:   CORONARY STENT PLACEMENT   4 stents    LAPAROSCOPIC GASTROTOMY W/ REPAIR OF ULCER       Meds/Allergies:  Prior to Admission medications    Medication Sig Start Date End Date Taking? Authorizing Provider   amLODIPine (NORVASC) 10 mg tablet Take 10 mg by mouth daily   Yes Historical Provider, MD   atorvastatin (LIPITOR) 80 mg tablet Take 80 mg by mouth daily   Yes Historical Provider, MD   clopidogrel (PLAVIX) 75 mg tablet Take 75 mg by mouth daily   Yes Historical Provider, MD   lisinopril (ZESTRIL) 10 mg tablet Take 10 mg by mouth daily   Yes Historical Provider, MD   metFORMIN (GLUCOPHAGE) 1000 MG tablet Take 1,000 mg by mouth 2 (two) times a day with meals   Yes Historical Provider, MD   methocarbamol (ROBAXIN) 750 mg tablet Take 750 mg by mouth every 6 (six) hours as needed for muscle spasms   Yes Historical Provider, MD   metoprolol succinate (TOPROL-XL) 100 mg 24 hr tablet Take 100 mg by mouth daily   Yes Historical Provider, MD   nitroglycerin (NITROSTAT) 0 4 mg SL tablet Place 0 4 mg under the tongue every 5 (five) minutes as needed for chest pain   Yes Historical Provider, MD   zolpidem (Ambien) 10 mg tablet Take 10 mg by mouth daily at bedtime as needed for sleep   Yes Historical Provider, MD   oxyCODONE-acetaminophen (PERCOCET) 5-325 mg per tablet Take 1 tablet by mouth every 6 (six) hours as needed for moderate pain  8/22/22 Yes Historical Provider, MD   insulin glargine (LANTUS) 100 units/mL subcutaneous injection Inject 50 Units under the skin daily    Historical Provider, MD     I have reviewed home medications using recent Epic encounter      Allergies: No Known Allergies    Social History:  Marital Status: /Civil Union   Occupation: retired  Patient Pre-hospital Living Situation: Home with family  Patient Pre-hospital Level of Mobility: walks without assistance  Patient Pre-hospital Diet Restrictions: none  Substance Use History:   Social History     Substance and Sexual Activity   Alcohol Use Not Currently     Social History     Tobacco Use   Smoking Status Current Some Day Smoker    Packs/day: 0 25   Smokeless Tobacco Never Used     Social History     Substance and Sexual Activity   Drug Use Never       Family History:  No FH stroke, brain or neck CA  Physical Exam:     Vitals:   Blood Pressure: 137/76 (08/22/22 1707)  Pulse: 72 (08/22/22 1707)  Temperature: 97 8 °F (36 6 °C) (08/22/22 1707)  Respirations: 18 (08/22/22 1707)  Weight - Scale: 111 kg (245 lb) (08/22/22 1319)  SpO2: 96 % (08/22/22 1707)    Physical Exam  Vitals and nursing note reviewed  Constitutional:       General: He is not in acute distress  Appearance: He is obese  He is not ill-appearing, toxic-appearing or diaphoretic  Comments: nad   HENT:      Head: Normocephalic and atraumatic  Nose: Congestion present  Mouth/Throat:      Mouth: Mucous membranes are moist    Eyes:      Conjunctiva/sclera: Conjunctivae normal    Neck:      Comments: Muscle tenderness trapezius and occipitalis muscles no point tenderness  Cardiovascular:      Rate and Rhythm: Normal rate and regular rhythm  Pulses: Normal pulses  Heart sounds: Murmur heard  Pulmonary:      Effort: No respiratory distress  Breath sounds: Normal breath sounds  No stridor  No wheezing, rhonchi or rales  Chest:      Chest wall: No tenderness  Abdominal:      General: Bowel sounds are normal  There is no distension  Palpations: Abdomen is soft  Tenderness: There is no abdominal tenderness  There is no guarding  Musculoskeletal:         General: No swelling  Normal range of motion  Cervical back: Normal range of motion  Tenderness present  No rigidity     Skin: General: Skin is warm and dry  Neurological:      General: No focal deficit present  Mental Status: He is alert and oriented to person, place, and time  GCS: GCS eye subscore is 4  GCS verbal subscore is 5  GCS motor subscore is 6  Cranial Nerves: No cranial nerve deficit  Sensory: No sensory deficit  Motor: No weakness  Gait: Gait normal    Psychiatric:         Mood and Affect: Mood normal          Behavior: Behavior normal           Additional Data:     Lab Results:  Results from last 7 days   Lab Units 08/22/22  1327   WBC Thousand/uL 14 60*   HEMOGLOBIN g/dL 12 5   HEMATOCRIT % 40 6   PLATELETS Thousands/uL 313   NEUTROS PCT % 73   LYMPHS PCT % 14   MONOS PCT % 6   EOS PCT % 5     Results from last 7 days   Lab Units 08/22/22  1327   SODIUM mmol/L 135   POTASSIUM mmol/L 3 6   CHLORIDE mmol/L 99   CO2 mmol/L 24   BUN mg/dL 22   CREATININE mg/dL 1 45*   ANION GAP mmol/L 12   CALCIUM mg/dL 9 1   ALBUMIN g/dL 3 5   TOTAL BILIRUBIN mg/dL 0 35   ALK PHOS U/L 107   ALT U/L 23   AST U/L 13   GLUCOSE RANDOM mg/dL 342*     Results from last 7 days   Lab Units 08/22/22  1327   INR  0 96     Results from last 7 days   Lab Units 08/22/22  1703 08/22/22  1329   POC GLUCOSE mg/dl 137 304*               Imaging: Reviewed radiology reports from this admission including: CT head  MRI brain w wo contrast   Final Result by Sukhdeep Holliday MD (08/22 1651)      1  Different age blood products and signal abnormality involving inferior cerebellar vermis with small adjacent acute/subacute subarachnoid hemorrhage  There is associated small linear (likely vascular) enhancement  Findings correspond to the    hyperdensity seen in earlier head CT and most likely represent a recently bled vascular anomaly such as cavernoma  Recommend 6-8 weeks follow-up MRI without and with contrast to exclude less favored neoplastic process  2   Nonspecific white matter change suggesting microangiopathy  The study was marked in EPIC for significant notification  Workstation performed: [id number]         CT head without contrast   Final Result by Jana Huggins MD (08/22 1419)      Vague focus of hyperdensity in the inferior cerebellar vermis as above  Further evaluation with MRI without and with intravenous gadolinium is advised  I personally discussed this study with Dariel Allen on 8/22/2022 at 2:18 PM                            Workstation performed: [id number]         CTA head w wo contrast    (Results Pending)       EKG and Other Studies Reviewed on Admission:   · EKG: NSR  HR 80     ** Please Note: This note has been constructed using a voice recognition system   **

## 2022-08-23 PROBLEM — I72.5 ANEURYSM OF BASILAR ARTERY (HCC): Status: ACTIVE | Noted: 2022-08-23

## 2022-08-23 PROBLEM — R07.2 PRECORDIAL CHEST PAIN: Status: ACTIVE | Noted: 2022-08-23

## 2022-08-23 LAB
ANION GAP SERPL CALCULATED.3IONS-SCNC: 8 MMOL/L (ref 4–13)
ATRIAL RATE: 71 BPM
BUN SERPL-MCNC: 17 MG/DL (ref 5–25)
CALCIUM SERPL-MCNC: 8 MG/DL (ref 8.3–10.1)
CHLORIDE SERPL-SCNC: 102 MMOL/L (ref 96–108)
CO2 SERPL-SCNC: 27 MMOL/L (ref 21–32)
CREAT SERPL-MCNC: 1.01 MG/DL (ref 0.6–1.3)
GFR SERPL CREATININE-BSD FRML MDRD: 82 ML/MIN/1.73SQ M
GLUCOSE P FAST SERPL-MCNC: 133 MG/DL (ref 65–99)
GLUCOSE SERPL-MCNC: 126 MG/DL (ref 65–140)
GLUCOSE SERPL-MCNC: 132 MG/DL (ref 65–140)
GLUCOSE SERPL-MCNC: 133 MG/DL (ref 65–140)
GLUCOSE SERPL-MCNC: 212 MG/DL (ref 65–140)
GLUCOSE SERPL-MCNC: 226 MG/DL (ref 65–140)
P AXIS: 23 DEGREES
POTASSIUM SERPL-SCNC: 3.8 MMOL/L (ref 3.5–5.3)
PR INTERVAL: 168 MS
QRS AXIS: 63 DEGREES
QRSD INTERVAL: 104 MS
QT INTERVAL: 394 MS
QTC INTERVAL: 428 MS
SODIUM SERPL-SCNC: 137 MMOL/L (ref 135–147)
T WAVE AXIS: -10 DEGREES
VENTRICULAR RATE: 71 BPM

## 2022-08-23 PROCEDURE — 99233 SBSQ HOSP IP/OBS HIGH 50: CPT | Performed by: FAMILY MEDICINE

## 2022-08-23 PROCEDURE — 80048 BASIC METABOLIC PNL TOTAL CA: CPT | Performed by: FAMILY MEDICINE

## 2022-08-23 PROCEDURE — 82948 REAGENT STRIP/BLOOD GLUCOSE: CPT

## 2022-08-23 RX ADMIN — METOPROLOL SUCCINATE 100 MG: 100 TABLET, EXTENDED RELEASE ORAL at 08:37

## 2022-08-23 RX ADMIN — INSULIN GLARGINE 50 UNITS: 100 INJECTION, SOLUTION SUBCUTANEOUS at 21:20

## 2022-08-23 RX ADMIN — LISINOPRIL 10 MG: 10 TABLET ORAL at 08:37

## 2022-08-23 RX ADMIN — INSULIN LISPRO 2 UNITS: 100 INJECTION, SOLUTION INTRAVENOUS; SUBCUTANEOUS at 12:46

## 2022-08-23 RX ADMIN — AMLODIPINE BESYLATE 10 MG: 10 TABLET ORAL at 08:37

## 2022-08-23 RX ADMIN — LIDOCAINE 5% 1 PATCH: 700 PATCH TOPICAL at 08:37

## 2022-08-23 RX ADMIN — INSULIN LISPRO 2 UNITS: 100 INJECTION, SOLUTION INTRAVENOUS; SUBCUTANEOUS at 21:20

## 2022-08-23 RX ADMIN — ATORVASTATIN CALCIUM 80 MG: 40 TABLET, FILM COATED ORAL at 16:27

## 2022-08-23 NOTE — ASSESSMENT & PLAN NOTE
Lab Results   Component Value Date    HGBA1C 9 8 (H) 11/30/2021       Recent Labs     08/22/22  1703 08/22/22  2112 08/23/22  0713 08/23/22  1107   POCGLU 137 148* 126 212*       Blood Sugar Average: Last 72 hrs:  (P) 185 4     · Home regimen: Lantus 50 u qam, Metformin 1000 mg bid  · IP regimen: Lantus 50 u qam & Lispro ssi achs, ADA diet

## 2022-08-23 NOTE — OCCUPATIONAL THERAPY NOTE
Occupational Therapy Screen    Patient Name: Oracio RICHARDSON Date: 8/23/2022 08/23/22 5175   Note Type   Note type Screen     OT orders received  Chart review completed  Patient admitted to [address] [place] [address] on 8/22/2022 with Dx: subarachnoid hemorrhage  Spoke with pt's nursing staff who reported patient was I in room  Observed pt in room completing toileting tasks, functional mobility and donning/doffing socks without difficulty  Spoke with pt who reported no concerns regarding ADLs, IADLs or functional mobility upon return to home  Symptoms has resolved, no visual or coordination deficits noted  Pt appears to be at prior level of functioning at this time and does not require acute OT services  D/C OT effective 8/23/2022  If new concerns arise, please re-consult      ROLDAN Hartman/MARISOL

## 2022-08-23 NOTE — PLAN OF CARE
Problem: NEUROSENSORY - ADULT  Goal: Achieves stable or improved neurological status  Description: INTERVENTIONS  - Monitor and report changes in neurological status  - Monitor vital signs such as temperature, blood pressure, glucose, and any other labs ordered   - Initiate measures to prevent increased intracranial pressure  - Monitor for seizure activity and implement precautions if appropriate      Outcome: Progressing     Problem: NEUROSENSORY - ADULT  Goal: Remains free of injury related to seizures activity  Description: INTERVENTIONS  - Maintain airway, patient safety  and administer oxygen as ordered  - Monitor patient for seizure activity, document and report duration and description of seizure to physician/advanced practitioner  - If seizure occurs,  ensure patient safety during seizure  - Reorient patient post seizure  - Seizure pads on all 4 side rails  - Instruct patient/family to notify RN of any seizure activity including if an aura is experienced  - Instruct patient/family to call for assistance with activity based on nursing assessment  - Administer anti-seizure medications if ordered    Outcome: Progressing     Problem: NEUROSENSORY - ADULT  Goal: Achieves maximal functionality and self care  Description: INTERVENTIONS  - Monitor swallowing and airway patency with patient fatigue and changes in neurological status  - Encourage and assist patient to increase activity and self care     - Encourage visually impaired, hearing impaired and aphasic patients to use assistive/communication devices  Outcome: Progressing

## 2022-08-23 NOTE — ASSESSMENT & PLAN NOTE
CINTHYA score 4  Troponin is  to 15  No significant EKG changes  Hold any kind of anticoagulation due to subarachnoid hemorrhage  Suspect most likely a typical

## 2022-08-23 NOTE — PROGRESS NOTES
[address]  Progress Note - Rosana Days 1965, 62 y o  male MRN: [id number]  Unit/Bed#: -01 Encounter: [id number]  Primary Care Provider: Radha Tan PA-C   Date and time admitted to hospital: 8/22/2022  1:23 PM    Aneurysm of basilar artery [facility])  Assessment & Plan   Incidental 2 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 basilar artery immediately above right SCA origin  Discussed the case with Neurosurgery over the phone-recommends conservative management  * SAH (subarachnoid hemorrhage) (HCC)  Assessment & Plan  · CTH with lesion in cerebellum  · CTA H&N await rad read  · MRI brain with cerebellum vermis subacute and acute subarachnoid hemorrhage - I messaged NS again regarding this and await response  · No trauma per patient  · Neuro exam non focal, GCS 15, VSS  There is cervical tenderness but full ROM of cspine  · Fall risk - PT/OT evals ordered  · Case discussed with Neurosurgery-recommending to repeat CT scan of head without contrast in a m  and till then hold any kind of anticoagulation including aspirin Plavix    If remained stable, can resume 1 of them and if Neurosurgery clears, patient may be discharge  · Patient will need repeat MRI with and without contrast after 6-8 weeks    Precordial chest pain  Assessment & Plan  CINTHYA score 4  Troponin is  to 15  No significant EKG changes  Hold any kind of anticoagulation due to subarachnoid hemorrhage  Suspect most likely a typical    H/o PUD (peptic ulcer disease)  Assessment & Plan  · H/o gastrostomy and ulcer repair in the past  · Tums prn    Stage 3a chronic kidney disease [facility])  Assessment & Plan  Lab Results   Component Value Date    EGFR 82 08/23/2022    EGFR 53 08/22/2022    CREATININE 1 01 08/23/2022    CREATININE 1 45 (H) 08/22/2022     · CKD baseline Cr 1 3, not technically an KELVIN right now  · Cont home meds  · Other labs with possibly dehydration will give slight IVF and monitor BMP tomorrow am    CAD  Assessment & Plan  · H/o 4 stents  · Cont BB and statin and antihypertensive regimen  · Hold plavix in [facility], await NS recs on this  · Trop wnl and EKG without abnormality on admission  · Patient complaining of 2 week history of chest pain with radiation to the left arm and numbness which also has fatigue, diaphoresis and this has been daily occurrence  Hasn't tried nitro at home for this  · Needs stress test once [facility] is addressed  · Will keep on tele  · EKG with any chest pain episodes    Type 2 diabetes mellitus with hyperglycemia, with long-term current use of insulin [facility])  Assessment & Plan  Lab Results   Component Value Date    HGBA1C 9 8 (H) 11/30/2021       Recent Labs     08/22/22  1703 08/22/22  2112 08/23/22  0713 08/23/22  1107   POCGLU 137 148* 126 212*       Blood Sugar Average: Last 72 hrs:  (P) 185 4     · Home regimen: Lantus 50 u qam, Metformin 1000 mg bid  · IP regimen: Lantus 50 u qam & Lispro ssi achs, ADA diet  Essential hypertension  Assessment & Plan  · Cont home meds        VTE Pharmacologic Prophylaxis: VTE Score: 6 Hold any kind of anticoagulation due to subarachnoid hemorrhage    Patient Centered Rounds: I performed bedside rounds with nursing staff today  Discussions with Specialists or Other Care Team Provider:  Neurosurgery    Education and Discussions with Family / Patient: Patient prefers to update his wife personal      Time Spent for Care: 20 minutes  More than 50% of total time spent on counseling and coordination of care as described above  Current Length of Stay: 0 day(s)  Current Patient Status: Inpatient   Certification Statement: To monitor above condition  Discharge Plan: Anticipate discharge in 24-48 hrs to home  Code Status: Level 1 - Full Code    Subjective: The patient will continue to require additional inpatient hospital stay due to Seen and evaluated during the round  Resting comfortably  Denies any significant complaint    Reports his symptoms is much better compared to yesterday  Denies any neck stiffness, any blurry vision  Objective:     Vitals:   Temp (24hrs), Av °F (36 7 °C), Min:97 6 °F (36 4 °C), Max:98 6 °F (37 °C)    Temp:  [97 6 °F (36 4 °C)-98 6 °F (37 °C)] 98 6 °F (37 °C)  HR:  [63-77] 63  Resp:  [16-24] 16  BP: (137-173)/(65-82) 145/78  SpO2:  [82 %-99 %] 93 %  There is no height or weight on file to calculate BMI  Input and Output Summary (last 24 hours): Intake/Output Summary (Last 24 hours) at 2022 1147  Last data filed at 2022 0841  Gross per 24 hour   Intake 1490 42 ml   Output 1975 ml   Net -484 58 ml       Physical Exam:   Physical Exam  Vitals and nursing note reviewed  Constitutional:       Appearance: Normal appearance  He is obese  He is not ill-appearing or diaphoretic  HENT:      Head: Normocephalic and atraumatic  Nose: Nose normal  No congestion  Mouth/Throat:      Mouth: Mucous membranes are moist       Pharynx: Oropharynx is clear  No oropharyngeal exudate  Eyes:      General: No scleral icterus  Left eye: No discharge  Extraocular Movements: Extraocular movements intact  Conjunctiva/sclera: Conjunctivae normal       Pupils: Pupils are equal, round, and reactive to light  Neck:      Vascular: No carotid bruit  Cardiovascular:      Rate and Rhythm: Normal rate  Pulses: Normal pulses  Heart sounds: Normal heart sounds  No murmur heard  No friction rub  No gallop  Pulmonary:      Effort: Pulmonary effort is normal  No respiratory distress  Breath sounds: No stridor  No wheezing or rhonchi  Abdominal:      General: Abdomen is flat  Bowel sounds are normal  There is no distension  Palpations: There is no mass  Tenderness: There is no abdominal tenderness  There is no rebound  Hernia: No hernia is present  Musculoskeletal:         General: No swelling, tenderness, deformity or signs of injury  Normal range of motion  Cervical back: Normal range of motion  No rigidity or tenderness  Lymphadenopathy:      Cervical: No cervical adenopathy  Skin:     General: Skin is warm  Neurological:      General: No focal deficit present  Mental Status: He is alert and oriented to person, place, and time  Cranial Nerves: No cranial nerve deficit  Sensory: No sensory deficit  Motor: No weakness        Coordination: Coordination normal       Deep Tendon Reflexes: Reflexes normal          Additional Data:     Labs:  Results from last 7 days   Lab Units 08/22/22  1327   WBC Thousand/uL 14 60*   HEMOGLOBIN g/dL 12 5   HEMATOCRIT % 40 6   PLATELETS Thousands/uL 313   NEUTROS PCT % 73   LYMPHS PCT % 14   MONOS PCT % 6   EOS PCT % 5     Results from last 7 days   Lab Units 08/23/22  0636 08/22/22  1327   SODIUM mmol/L 137 135   POTASSIUM mmol/L 3 8 3 6   CHLORIDE mmol/L 102 99   CO2 mmol/L 27 24   BUN mg/dL 17 22   CREATININE mg/dL 1 01 1 45*   ANION GAP mmol/L 8 12   CALCIUM mg/dL 8 0* 9 1   ALBUMIN g/dL  --  3 5   TOTAL BILIRUBIN mg/dL  --  0 35   ALK PHOS U/L  --  107   ALT U/L  --  23   AST U/L  --  13   GLUCOSE RANDOM mg/dL 133 342*     Results from last 7 days   Lab Units 08/22/22  1327   INR  0 96     Results from last 7 days   Lab Units 08/23/22  1107 08/23/22  0713 08/22/22  2112 08/22/22  1703 08/22/22  1329   POC GLUCOSE mg/dl 212* 126 148* 137 304*               Lines/Drains:  Invasive Devices  Report    Peripheral Intravenous Line  Duration           Peripheral IV 08/22/22 Left Antecubital <1 day    Peripheral IV 08/22/22 Left;Ventral (anterior) Hand <1 day                  Telemetry:  Telemetry Orders (From admission, onward)             48 Hour Telemetry Monitoring  Continuous x 48 hours        References:    Telemetry Guidelines   Question:  Reason for 48 Hour Telemetry  Answer:  Acute MI, chest pain - R/O MI, or unstable angina                 Telemetry Reviewed: Normal Sinus Rhythm  Indication for Continued Telemetry Use: No indication for continued use  Will discontinue  Imaging: No pertinent imaging reviewed  Recent Cultures (last 7 days):         Last 24 Hours Medication List:   Current Facility-Administered Medications   Medication Dose Route Frequency Provider Last Rate    acetaminophen  650 mg Oral Q6H PRN Gwen Castano MD      amLODIPine  10 mg Oral Daily Gwen Castano MD      atorvastatin  80 mg Oral Daily With Shelley Brands, MD      calcium carbonate  1,000 mg Oral Daily PRN Gwen Castano MD      HYDROmorphone  0 5 mg Intravenous Q4H PRN Gwen Castano MD      insulin glargine  50 Units Subcutaneous HS Ponce Valentine MD      insulin lispro  1-6 Units Subcutaneous TID AC Ponce Valentine MD      insulin lispro  1-6 Units Subcutaneous HS Gwen Castano MD      lidocaine  1 patch Topical Daily Gwen Castano MD      lisinopril  10 mg Oral Daily Ponce Valentine MD      loperamide  2 mg Oral BID PRN Gwen Castano MD      meclizine  25 mg Oral Q8H PRN Gwen Castano MD      methocarbamol  750 mg Oral Q6H PRN Gwen Castano MD      metoprolol succinate  100 mg Oral Daily Gwen Castano MD      nicotine  1 patch Transdermal Daily Ponce Valentine MD      ondansetron  4 mg Intravenous Q6H PRN Gwen Castano MD      senna  1 tablet Oral HS PRN Gwen Castano MD      zolpidem  10 mg Oral HS PRN Gwen Castano MD          Today, Patient Was Seen By: Gwen Castano MD    **Please Note: This note may have been constructed using a voice recognition system  **

## 2022-08-23 NOTE — ASSESSMENT & PLAN NOTE
Lab Results   Component Value Date    EGFR 82 08/23/2022    EGFR 53 08/22/2022    CREATININE 1 01 08/23/2022    CREATININE 1 45 (H) 08/22/2022     · CKD baseline Cr 1 3, not technically an KELVIN right now  · Cont home meds  · Other labs with possibly dehydration will give slight IVF and monitor BMP tomorrow am

## 2022-08-23 NOTE — ASSESSMENT & PLAN NOTE
Incidental 2 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 basilar artery immediately above right SCA origin  Discussed the case with Neurosurgery over the phone-recommends conservative management

## 2022-08-23 NOTE — PHYSICAL THERAPY NOTE
Physical Therapy Screen    Patient Name: Pablo Barker    UNKSR'[id number] Date: 8/23/2022     Problem List  Principal Problem:    SAH (subarachnoid hemorrhage) ([facility] )  Active Problems:    Essential hypertension    Type 2 diabetes mellitus with hyperglycemia, with long-term current use of insulin (HCC)    Low back pain    CAD    Stage 3a chronic kidney disease ([facility] )    H/o PUD (peptic ulcer disease)       Past Medical History  Past Medical History:   Diagnosis Date    Diabetes mellitus ([facility] )     Hypertension         Past Surgical History  History reviewed  No pertinent surgical history  08/23/22 0915   PT Last Visit   PT Visit Date 08/23/22   Note Type   Note type Screen       Received order for PT consult  Chart reviewed  Pt admitted with diagnosis SAH  Spoke with RN, [facility] who reports patient is independent in room  Spoke with patient who reports he is at his PLOF of independent at this time  He reports ambulating in room without difficulty  He reports feeling slightly off balance with occasional dizziness, but no concerns with returning home upon discharge  Pt in bathroom upon arrival, ambulated out of bathroom independently without difficulty  Pt observed completing transfers and ambulation independently  He demonstrated slight path deviation with head turn to right that he recovered independently via stepping with slight c/o dizziness  He reports slight c/o dizziness with head turn to right sitting, but subsides quickly  Discussed slow head turns to decrease risk of dizziness as quick head turns appear to provoke dizziness  Discussed recommendation for OPPT services upon D/C with focus on balance and dizziness  Will D/C PT services at this time as patient is at his PLOF of independent without acute care PT services warranted  Should patient's status change, please re-consult      Leona Coffman, PT,DPT

## 2022-08-23 NOTE — ASSESSMENT & PLAN NOTE
· CTH with lesion in cerebellum  · CTA H&N await rad read  · MRI brain with cerebellum vermis subacute and acute subarachnoid hemorrhage - I messaged NS again regarding this and await response  · No trauma per patient  · Neuro exam non focal, GCS 15, VSS  There is cervical tenderness but full ROM of cspine  · Fall risk - PT/OT evals ordered  · Case discussed with Neurosurgery-recommending to repeat CT scan of head without contrast in a m  and till then hold any kind of anticoagulation including aspirin Plavix    If remained stable, can resume 1 of them and if Neurosurgery clears, patient may be discharge  · Patient will need repeat MRI with and without contrast after 6-8 weeks

## 2022-08-23 NOTE — CASE MANAGEMENT
Case Management Assessment & Discharge Planning Note    Patient name Snehal Wiggins  Location /-01 MRN [id number]  : 1965 Date 2022       Current Admission Date: 2022  Current Admission Diagnosis:SAH (subarachnoid hemorrhage) [facility])   Patient Active Problem List    Diagnosis Date Noted    Essential hypertension     Type 2 diabetes mellitus with hyperglycemia, with long-term current use of insulin ([facility] )     SAH (subarachnoid hemorrhage) (HCC)     Low back pain     CAD     Stage 3a chronic kidney disease ([facility] )     H/o PUD (peptic ulcer disease)       LOS (days): 0  Geometric Mean LOS (GMLOS) (days):   Days to GMLOS:     OBJECTIVE:              Current admission status: Observation  Referral Reason:  (dc planning)    Preferred Pharmacy:   [address], [address] [place] [address]  Phone: [phone] Fax: [phone]    Primary Care Provider: Ana Salguero PA-C    Primary Insurance: [facility]  Secondary Insurance:      Admit with dizziness, SAH, possible cavernoma  Neurology eval  Plan repeat scan    ASSESSMENT:  [address] Proxies    There are no active Health Care Proxies on file  Advance Directives  Does patient have a [address]?: No  Does patient currently have a Health Care decision maker?: Yes, please see Health Care Proxy section  Does patient have Advance Directives?: No  Was patient offered paperwork?: Yes (declined)  Primary Contact: Kylee Cooper, spouse         Readmission Root Cause  30 Day Readmission: No    Patient Information  Mental Status: Alert  During Assessment patient was accompanied by: Not accompanied during assessment  Assessment information provided by[de-identified] Patient  Primary Caregiver: Self  Support Systems: Spouse/significant other, Family members  [place] of Residence: [facility]  do you live in?: [address] access options   Select all that apply : Stairs  Number of steps to enter home : 1  Type of Current Residence: [address]  Upon entering residence, is there a bedroom on the main floor (no further steps)?: No  A bedroom is located on the following floor levels of residence (select all that apply):: 2nd Floor  Upon entering residence, is there a bathroom on the main floor (no further steps)?: No  Indicate which floors of current residence have a bathroom (select all the apply):: 2nd Floor  Number of steps to 2nd floor from main floor:  (15 steps)  In the last 12 months, was there a time when you were not able to pay the mortgage or rent on time?: No  In the last 12 months, how many places have you lived?: 1  In the last 12 months, was there a time when you did not have a steady place to sleep or slept in a shelter (including now)?: No  Homeless/housing insecurity resource given?: No  Living Arrangements: Lives w/ Spouse/significant other, Other (Comment) (3 young children)  Is patient a ?: Yes  Is patient active with [facility])?: No    Activities of Daily Living Prior to Admission  Functional Status: Independent  Completes ADLs independently?: Yes  Ambulates independently?: Yes  Does patient use assisted devices?: No  Does patient currently own DME?: No  Does patient have a history of Outpatient Therapy (PT/OT)?: No  Does the patient have a history of Short-Term Rehab?: No  Does patient have a history of HHC?: No  Does patient currently have [facility]?: No         Patient Information Continued  Income Source: Pension/longterm  Does patient have prescription coverage?: No  Within the past 12 months, you worried that your food would run out before you got the money to buy more : Never true  Within the past 12 months, the food you bought just didn't last and you didn't have money to get more : Never true  Food insecurity resource given?: No  Does patient receive dialysis treatments?: No  Does patient have a history of substance abuse?: No         Means of Transportation  Means of Transport to Appts[de-identified] Drives Self  In the past 12 months, has lack of transportation kept you from medical appointments or from getting medications?: No  In the past 12 months, has lack of transportation kept you from meetings, work, or from getting things needed for daily living?: No  Was application for public transport provided?: No        DISCHARGE DETAILS:    Discharge planning discussed with[de-identified] patient  Freedom of Choice: Yes     Informed by patient he is in the midst of changing his PCP  He has an appointment with [facility] in [address] [place] [address] this week  Provided patient a Medical Information release form for patient to take with him for his new PCP appointment, so the PCP can request medical records  CM contacted family/caregiver?: No- see comments (declined)                  Requested [address]         Is the patient interested in [place] [address] at discharge?: No    DME Referral Provided  Referral made for DME?: No                 Discharge Destination Plan[de-identified] Home  Transport at Discharge : Auto with designated            - Per therapy patient would benefit with OP PT for focus on balance and dizziness  CM provided patient a list of OP [facility] [address] locations

## 2022-08-23 NOTE — NURSING NOTE
Having brief periods of sleep apnea with sats decreasing to 82%,placed on 2L nasal cannula for sleep

## 2022-08-23 NOTE — PLAN OF CARE
Problem: Potential for Falls  Goal: Patient will remain free of falls  Description: INTERVENTIONS:  - Educate patient/family on patient safety including physical limitations  - Instruct patient to call for assistance with activity   - Consult OT/PT to assist with strengthening/mobility   - Keep Call bell within reach  - Keep bed low and locked with side rails adjusted as appropriate  - Keep care items and personal belongings within reach  - Initiate and maintain comfort rounds  - Make Fall Risk Sign visible to staff  - Offer Toileting every 2 Hours, in advance of need  - Initiate/Maintain bed alarm  - Obtain necessary fall risk management equipment: yellow socks  - Apply yellow socks and bracelet for high fall risk patients  - Consider moving patient to room near nurses station  Outcome: Progressing     Problem: PAIN - ADULT  Goal: Verbalizes/displays adequate comfort level or baseline comfort level  Description: Interventions:  - Encourage patient to monitor pain and request assistance  - Assess pain using appropriate pain scale  - Administer analgesics based on type and severity of pain and evaluate response  - Implement non-pharmacological measures as appropriate and evaluate response  - Consider cultural and social influences on pain and pain management  - Notify physician/advanced practitioner if interventions unsuccessful or patient reports new pain  Outcome: Progressing     Problem: INFECTION - ADULT  Goal: Absence or prevention of progression during hospitalization  Description: INTERVENTIONS:  - Assess and monitor for signs and symptoms of infection  - Monitor lab/diagnostic results  - Monitor all insertion sites, i e  indwelling lines, tubes, and drains  - Monitor endotracheal if appropriate and nasal secretions for changes in amount and color  - [place] appropriate cooling/warming therapies per order  - Administer medications as ordered  - Instruct and encourage patient and family to use good hand hygiene technique  - Identify and instruct in appropriate isolation precautions for identified infection/condition  Outcome: Progressing  Goal: Absence of fever/infection during neutropenic period  Description: INTERVENTIONS:  - Monitor WBC    Outcome: Progressing     Problem: SAFETY ADULT  Goal: Patient will remain free of falls  Description: INTERVENTIONS:  - Educate patient/family on patient safety including physical limitations  - Instruct patient to call for assistance with activity   - Consult OT/PT to assist with strengthening/mobility   - Keep Call bell within reach  - Keep bed low and locked with side rails adjusted as appropriate  - Keep care items and personal belongings within reach  - Initiate and maintain comfort rounds  - Make Fall Risk Sign visible to staff  - Offer Toileting every 2 Hours, in advance of need  - Initiate/Maintain bed alarm  - Obtain necessary fall risk management equipment: yellow socks  - Apply yellow socks and bracelet for high fall risk patients  - Consider moving patient to room near nurses station  Outcome: Progressing  Goal: Maintain or return to baseline ADL function  Description: INTERVENTIONS:  - Educate patient/family on patient safety including physical limitations  - Instruct patient to call for assistance with activity   - Consult OT/PT to assist with strengthening/mobility   - Keep Call bell within reach  - Keep bed low and locked with side rails adjusted as appropriate  - Keep care items and personal belongings within reach  - Initiate and maintain comfort rounds  - Make Fall Risk Sign visible to staff  - Offer Toileting every 2 Hours, in advance of need  - Initiate/Maintain bed alarm  - Obtain necessary fall risk management equipment: yellow socks  - Apply yellow socks and bracelet for high fall risk patients  - Consider moving patient to room near nurses station  Outcome: Progressing  Goal: Maintains/Returns to pre admission functional level  Description: INTERVENTIONS:  - Perform BMAT or MOVE assessment daily    - Set and communicate daily mobility goal to care team and patient/family/caregiver  - Collaborate with rehabilitation services on mobility goals if consulted  - Perform Range of Motion 2   Problem: Knowledge Deficit  Goal: Patient/family/caregiver demonstrates understanding of disease process, treatment plan, medications, and discharge instructions  Description: Complete learning assessment and assess knowledge base    Interventions:  - Provide teaching at level of understanding  - Provide teaching via preferred learning methods  Outcome: Progressing     Problem: DISCHARGE PLANNING  Goal: Discharge to home or other facility with appropriate resources  Description: INTERVENTIONS:  - Identify barriers to discharge w/patient and caregiver  - Arrange for needed discharge resources and transportation as appropriate  - Identify discharge learning needs (meds, wound care, etc )  - Arrange for interpretive services to assist at discharge as needed  - Refer to Case Management Department for coordinating discharge planning if the patient needs post-hospital services based on physician/advanced practitioner order or complex needs related to functional status, cognitive ability, or social support system  Outcome: Progressing     Problem: MOBILITY - ADULT  Goal: Maintain or return to baseline ADL function  Description: INTERVENTIONS:  - Educate patient/family on patient safety including physical limitations  - Instruct patient to call for assistance with activity   - Consult OT/PT to assist with strengthening/mobility   - Keep Call bell within reach  - Keep bed low and locked with side rails adjusted as appropriate  - Keep care items and personal belongings within reach  - Initiate and maintain comfort rounds  - Make Fall Risk Sign visible to staff  - Offer Toileting every 2 Hours, in advance of need  - Initiate/Maintain bed alarm  - Obtain necessary fall risk management equipment: yellow socks  - Apply yellow socks and bracelet for high fall risk patients  - Consider moving patient to room near nurses station  Outcome: Progressing  Goal: Maintains/Returns to pre admission functional level  Description: INTERVENTIONS:  - Perform BMAT or MOVE assessment daily    - Set and communicate daily mobility goal to care team and patient/family/caregiver  - Collaborate with rehabilitation services on mobility goals if consulted  a day  - Out of bed for toileting  - Record patient progress and toleration of activity level   Outcome: Progressing   times a day  - Reposition patient every 2 hours    - Stand patient 2  times a day  - Ambulate patient 2 times a day  - Out of bed to chair 2 times a day   - Out of bed for meals 3 times a day  - Out of bed for toileting  - Record patient progress and toleration of activity level   Outcome: Progressing

## 2022-08-23 NOTE — UTILIZATION REVIEW
Initial Clinical Review    Admission: Date/Time/Statement: 8/22/22 1503 Observation AND CHANGED 8/23/22 1139 INPATIENT RE: PATIENT NEEDS > 2 MIDNIGHT STAY DUE TO SUBARACHNOID HEMORRHAGE WITH CONTINUED NEURO MONITORING  Start   Ordered   08/23/22 1139  Inpatient Admission  Once        Comments: Subarachnoid hemorrhage   Transfer Service: Hospitalist       Question Answer Comment   Level of Care Level 2 Stepdown / HOT    Estimated length of stay More than 2 Midnights    Certification I certify that inpatient services are medically necessary for this patient for a duration of greater than two midnights  See H&P and MD Progress Notes for additional information about the patient's course of treatment  08/23/22 1139       ED Arrival Information     Expected   -    Arrival   8/22/2022 13:10    Acuity   Emergent            Means of arrival   Walk-In    Escorted by   Self    Service   General Medicine    Admission type   Emergency            Arrival complaint   left arm tingling, SOB, blurred vision, loss balance           Chief Complaint   Patient presents with    Weakness - Generalized     Pt reports "imbalance" for two weeks  Reports pain/numbness in L arm  Strength equal bilaterally   Chest Pain     Pt reports intermittent L sided CP for three days, SOB        Initial Presentation: 62 y o  male from home to ED admitted to observation [address]  due to Subarachnoid hemorrhage with unknown etiology, 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 to cavernoma  Presented due to dizziness as is off balance with walking, pain in left arm and intermittent chest pain starting about 2 weeks prior to arrival   On exam  Abnormal heel-toe test    glucose 342  Bun 22, creatinine 1 45  Wbc 14 60  Ct brain showed hypodensity in inferior cerebellar vermis  MRI brain also showing acute/subacute subarachnoid hemorrhage  In the ED given 1 liter IVF, Regular insulin  Plan is neurosurgery consult  Serial neuro checks    Cta head and neck   Hold home metformin and continue Lantus, add SSI with accuchecks  Start IVF as suspect slight dehydration  8/22/22 discussed with neurosurgery and to monitor neuro status, repeat MRI in 6 to 8 weeks  Hold asa and Plavix    8/23/22 CHANGED TO INPATIENT:  Less neck stiffness and no blurry vision today  Denies trauma  On exam: obese  No focal neuro deficits  Glucose 126, 212  Greg Phan Continue neuro checks  Ct head in am     Telemetry    Stress once cleared by neuro surgery as admits to 2 weeks of intermittent chest pain with radiation to left arm      8/23/22 discussed with neurosurgery - repeat ct head in am   Hold any anticoagulation     ED Triage Vitals   Temperature Pulse Respirations Blood Pressure SpO2   08/22/22 1319 08/22/22 1319 08/22/22 1319 08/22/22 1319 08/22/22 1319   98 °F (36 7 °C) 77 16 145/82 99 %      Temp Source Heart Rate Source Patient Position - Orthostatic VS BP Location FiO2 (%)   08/22/22 1815 08/22/22 1430 08/22/22 1319 08/22/22 1319 --   Oral Monitor Lying Right arm       Pain Score       08/22/22 1319       5          Wt Readings from Last 1 Encounters:   08/22/22 111 kg (245 lb)     Additional Vital Signs:   /23/22 0700 97 6 °F (36 4 °C) 66 23 Abnormal  145/78 104 94 % -- -- None (Room air) Lying   08/23/22 0005 -- 69 24 Abnormal  -- -- 96 % 28 2 L/min Nasal cannula --   08/23/22 0000 -- 68 19 -- -- 82 % Abnormal  -- -- None (Room air) --   08/22/22 2300 98 1 °F (36 7 °C) 67 24 Abnormal  -- -- 94 % -- -- None (Room air) Lying   08/22/22 2245 -- 67 21 173/80 Abnormal  115 95 % -- -- -- --   08/22/22 2000 98 °F (36 7 °C) 67 24 Abnormal  150/75 104 97 % -- -- None (Room air) --   08/22/22 1815 97 9 °F (36 6 °C) 68 18 137/72 99 97 % -- -- None (Room air) Lying   08/22/22 17:07:40 97 8 °F (36 6 °C) 72 18 137/76 96 96 % -- -- -- --   08/22/22 1430 -- 69 16 152/70 100 96 % -- -- None (Room air)      Date and Time Eye Opening Best Verbal Response Best Motor Response [place] Coma Scale Score   08/23/22 0400 4 5 6 15   08/22/22 2330 4 5 6 15   08/22/22 2000 4 5 6 15   08/22/22 1745 4 5 6 15   08/22/22 1327 4 5 6 15       Pertinent Labs/Diagnostic Test Results:   CTA head w wo contrast   Final Result by Corinna Sanchez MD (08/22 1727)      1  Patent major vasculature of [place] without high-grade stenosis  No aneurysm or AV malformation corresponding to the abnormality seen in the region of cerebellar vermis in earlier CT and MR       2   Incidental 2 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 basilar artery immediately above right SCA origin  Recommend nonurgent neurovascular consultation  3   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  Developmentally hypoplastic right vertebral artery  The study was marked in EPIC for significant notification  Workstation performed: [id number]         MRI brain w wo contrast   Final Result by Corinna Sanchez MD (08/22 5849)      1  Different age blood products and signal abnormality involving inferior cerebellar vermis with small adjacent acute/subacute subarachnoid hemorrhage  There is associated small linear (likely vascular) enhancement  Findings correspond to the    hyperdensity seen in earlier head CT and most likely represent a recently bled vascular anomaly such as cavernoma  Recommend 6-8 weeks follow-up MRI without and with contrast to exclude less favored neoplastic process  2   Nonspecific white matter change suggesting microangiopathy  The study was marked in EPIC for significant notification  Workstation performed: [id number]         CT head without contrast   Final Result by Shania Collazo MD (08/22 8791)      Vague focus of hyperdensity in the inferior cerebellar vermis as above  Further evaluation with MRI without and with intravenous gadolinium is advised               I personally discussed this study with Justus Lamb on 8/22/2022 at 2:18 PM  Workstation performed: [id number]         CT head wo contrast    (Results Pending)       Results from last 7 days   Lab Units 08/22/22  1327   WBC Thousand/uL 14 60*   HEMOGLOBIN g/dL 12 5   HEMATOCRIT % 40 6   PLATELETS Thousands/uL 313   NEUTROS ABS Thousands/µL 10 81*     Results from last 7 days   Lab Units 08/23/22  0636 08/22/22  1327   SODIUM mmol/L 137 135   POTASSIUM mmol/L 3 8 3 6   CHLORIDE mmol/L 102 99   CO2 mmol/L 27 24   ANION GAP mmol/L 8 12   BUN mg/dL 17 22   CREATININE mg/dL 1 01 1 45*   EGFR ml/min/1 73sq m 82 53   CALCIUM mg/dL 8 0* 9 1   MAGNESIUM mg/dL  --  1 6     Results from last 7 days   Lab Units 08/22/22  1327   AST U/L 13   ALT U/L 23   ALK PHOS U/L 107   TOTAL PROTEIN g/dL 7 4   ALBUMIN g/dL 3 5   TOTAL BILIRUBIN mg/dL 0 35     Results from last 7 days   Lab Units 08/23/22  1107 08/23/22  0713 08/22/22  2112 08/22/22  1703 08/22/22  1329   POC GLUCOSE mg/dl 212* 126 148* 137 304*     Results from last 7 days   Lab Units 08/23/22  0636 08/22/22  1327   GLUCOSE RANDOM mg/dL 133 342*     BETA-HYDROXYBUTYRATE   Date Value Ref Range Status   08/22/2022 0 1 <0 6 mmol/L Final      Results from last 7 days   Lab Units 08/22/22  1344   PH KARAN  7 457*   PCO2 KARAN mm Hg 31 3*   PO2 KARAN mm Hg 154 3*   HCO3 KARAN mmol/L 21 6*   BASE EXC KARAN mmol/L -1 4   O2 CONTENT KARAN ml/dL 17 7   O2 HGB, VENOUS % 97 0*     Results from last 7 days   Lab Units 08/22/22  1327   CK TOTAL U/L 76     Results from last 7 days   Lab Units 08/22/22  1327   HS TNI 0HR ng/L 10     Results from last 7 days   Lab Units 08/22/22  1327   PROTIME seconds 12 9   INR  0 96   PTT seconds 27     Results from last 7 days   Lab Units 08/22/22  1327   TSH 3RD GENERATON uIU/mL 0 551     Results from last 7 days   Lab Units 08/22/22  1327   NT-PRO BNP pg/mL 127*     Results from last 7 days   Lab Units 08/22/22  1852   CLARITY UA  Clear   COLOR UA  Yellow   SPEC GRAV UA  1 020   PH UA  5 5   GLUCOSE UA mg/dl 100 (1/10%)*   KETONES UA mg/dl Negative   BLOOD UA  Negative   PROTEIN UA mg/dl Negative   NITRITE UA  Negative   BILIRUBIN UA  Negative   UROBILINOGEN UA E U /dl 0 2   LEUKOCYTES UA  Negative       ED Treatment:   Medication Administration from 08/22/2022 1309 to 08/22/2022 1630       Date/Time Order Dose Route Action Comments     08/22/2022 1329 sodium chloride 0 9 % bolus 1,000 mL 1,000 mL Intravenous New Bag      08/22/2022 1533 insulin regular (HumuLIN R,NovoLIN R) injection 8 Units 8 Units Subcutaneous Given      08/22/2022 1517 LORazepam (ATIVAN) injection 1 mg 1 mg Intravenous Given         Past Medical History:   Diagnosis Date    Diabetes mellitus ([address] )     Hypertension      Present on Admission:   Aneurysm of basilar artery (HCC)   Precordial chest pain      Admitting Diagnosis: Dizziness [R42]  Weakness generalized [R53 1]  Hyperglycemia [R73 9]  Chronic kidney disease [N18 9]  Cerebellar lesion [G93 9]  Age/Sex: 62 y o  male  Admission Orders:  Scheduled Medications:  amLODIPine, 10 mg, Oral, Daily  atorvastatin, 80 mg, Oral, Daily With Dinner  insulin glargine, 50 Units, Subcutaneous, HS  insulin lispro, 1-6 Units, Subcutaneous, TID AC  insulin lispro, 1-6 Units, Subcutaneous, HS  lidocaine, 1 patch, Topical, Daily  lisinopril, 10 mg, Oral, Daily  metoprolol succinate, 100 mg, Oral, Daily  nicotine, 1 patch, Transdermal, Daily    clopidogrel (PLAVIX) tablet 75 mg  Dose: 75 mg  Freq: Daily Route: PO  Start: 08/23/22 0900 End: 08/22/22 1716    Continuous IV Infusions:  multi-electrolyte (PLASMALYTE-A/ISOLYTE-S PH 7 4) IV solution  Rate: 125 mL/hr Dose: 125 mL/hr  Freq: Continuous Route: IV  Indications of Use: IV Hydration  Last Dose: Stopped (08/22/22 2330)  Start: 08/22/22 1700 End: 08/22/22 2348     PRN Meds:  acetaminophen, 650 mg, Oral, Q6H PRN- used x 1 8/22  calcium carbonate, 1,000 mg, Oral, Daily PRN  HYDROmorphone, 0 5 mg, Intravenous, Q4H PRN  loperamide, 2 mg, Oral, BID PRN  meclizine, 25 mg, Oral, Q8H PRN  methocarbamol, 750 mg, Oral, Q6H PRN  ondansetron, 4 mg, Intravenous, Q6H PRN  senna, 1 tablet, Oral, HS PRN  zolpidem, 10 mg, Oral, HS PRN    Telemetry   Neuro checks every 4 hours  IP CONSULT TO NEUROSURGERY    Network Utilization Review Department  ATTENTION: Please call with any questions or concerns to [phone] and carefully listen to the prompts so that you are directed to the right person  All voicemails are confidential   Jaycob Waddell all requests for admission clinical reviews, approved or denied determinations and any other requests to dedicated fax number below belonging to the campus where the patient is receiving treatment   List of dedicated fax numbers for the Facilities:  [address] DENIALS (Administrative/Medical Necessity) [phone]   [address] (Maternity/NICU/Pediatrics) [phone]   [address]  [address] [facility] [id number] [address] [phone]

## 2022-08-24 ENCOUNTER — APPOINTMENT (INPATIENT)
Dept: CT IMAGING | Facility: HOSPITAL | Age: 57
DRG: 092 | End: 2022-08-24
Payer: COMMERCIAL

## 2022-08-24 VITALS
SYSTOLIC BLOOD PRESSURE: 132 MMHG | TEMPERATURE: 97.1 F | DIASTOLIC BLOOD PRESSURE: 68 MMHG | WEIGHT: 245 LBS | OXYGEN SATURATION: 98 % | RESPIRATION RATE: 20 BRPM | HEART RATE: 74 BPM

## 2022-08-24 LAB
ANION GAP SERPL CALCULATED.3IONS-SCNC: 10 MMOL/L (ref 4–13)
BASOPHILS # BLD AUTO: 0.08 THOUSANDS/ΜL (ref 0–0.1)
BASOPHILS NFR BLD AUTO: 1 % (ref 0–1)
BUN SERPL-MCNC: 20 MG/DL (ref 5–25)
CALCIUM SERPL-MCNC: 9 MG/DL (ref 8.3–10.1)
CHLORIDE SERPL-SCNC: 100 MMOL/L (ref 96–108)
CO2 SERPL-SCNC: 27 MMOL/L (ref 21–32)
CREAT SERPL-MCNC: 1.27 MG/DL (ref 0.6–1.3)
EOSINOPHIL # BLD AUTO: 0.6 THOUSAND/ΜL (ref 0–0.61)
EOSINOPHIL NFR BLD AUTO: 5 % (ref 0–6)
ERYTHROCYTE [DISTWIDTH] IN BLOOD BY AUTOMATED COUNT: 15.8 % (ref 11.6–15.1)
GFR SERPL CREATININE-BSD FRML MDRD: 62 ML/MIN/1.73SQ M
GLUCOSE SERPL-MCNC: 127 MG/DL (ref 65–140)
GLUCOSE SERPL-MCNC: 130 MG/DL (ref 65–140)
GLUCOSE SERPL-MCNC: 136 MG/DL (ref 65–140)
HCT VFR BLD AUTO: 44.1 % (ref 36.5–49.3)
HGB BLD-MCNC: 13.6 G/DL (ref 12–17)
IMM GRANULOCYTES # BLD AUTO: 0.04 THOUSAND/UL (ref 0–0.2)
IMM GRANULOCYTES NFR BLD AUTO: 0 % (ref 0–2)
LYMPHOCYTES # BLD AUTO: 1.89 THOUSANDS/ΜL (ref 0.6–4.47)
LYMPHOCYTES NFR BLD AUTO: 16 % (ref 14–44)
MCH RBC QN AUTO: 24.1 PG (ref 26.8–34.3)
MCHC RBC AUTO-ENTMCNC: 30.8 G/DL (ref 31.4–37.4)
MCV RBC AUTO: 78 FL (ref 82–98)
MONOCYTES # BLD AUTO: 0.87 THOUSAND/ΜL (ref 0.17–1.22)
MONOCYTES NFR BLD AUTO: 7 % (ref 4–12)
NEUTROPHILS # BLD AUTO: 8.53 THOUSANDS/ΜL (ref 1.85–7.62)
NEUTS SEG NFR BLD AUTO: 71 % (ref 43–75)
NRBC BLD AUTO-RTO: 0 /100 WBCS
PLATELET # BLD AUTO: 295 THOUSANDS/UL (ref 149–390)
PMV BLD AUTO: 9.4 FL (ref 8.9–12.7)
POTASSIUM SERPL-SCNC: 3.8 MMOL/L (ref 3.5–5.3)
RBC # BLD AUTO: 5.64 MILLION/UL (ref 3.88–5.62)
SODIUM SERPL-SCNC: 137 MMOL/L (ref 135–147)
WBC # BLD AUTO: 12.01 THOUSAND/UL (ref 4.31–10.16)

## 2022-08-24 PROCEDURE — 80048 BASIC METABOLIC PNL TOTAL CA: CPT

## 2022-08-24 PROCEDURE — 99239 HOSP IP/OBS DSCHRG MGMT >30: CPT

## 2022-08-24 PROCEDURE — 85025 COMPLETE CBC W/AUTO DIFF WBC: CPT

## 2022-08-24 PROCEDURE — 70450 CT HEAD/BRAIN W/O DYE: CPT

## 2022-08-24 PROCEDURE — 82948 REAGENT STRIP/BLOOD GLUCOSE: CPT

## 2022-08-24 PROCEDURE — G1004 CDSM NDSC: HCPCS

## 2022-08-24 RX ADMIN — LIDOCAINE 5% 1 PATCH: 700 PATCH TOPICAL at 08:41

## 2022-08-24 RX ADMIN — ATORVASTATIN CALCIUM 80 MG: 40 TABLET, FILM COATED ORAL at 16:09

## 2022-08-24 RX ADMIN — METOPROLOL SUCCINATE 100 MG: 100 TABLET, EXTENDED RELEASE ORAL at 08:42

## 2022-08-24 RX ADMIN — LISINOPRIL 10 MG: 10 TABLET ORAL at 08:42

## 2022-08-24 RX ADMIN — AMLODIPINE BESYLATE 10 MG: 10 TABLET ORAL at 08:42

## 2022-08-24 NOTE — DISCHARGE SUMMARY
[address]  Discharge- Sissy Host 1965, 62 y o  male MRN: [id number]  Unit/Bed#: -01 Encounter: [id number]  Primary Care Provider: Lou Arboleda PA-C   Date and time admitted to hospital: 8/22/2022  1:23 PM    [facility] (subarachnoid hemorrhage) (HCC)  Assessment & Plan  · CTH with lesion in cerebellum  · CTA H&N await rad read  · MRI brain with cerebellum vermis subacute and acute subarachnoid hemorrhage -prior provided discussed with neurosurgery  · No trauma per patient- 2 weeks of "off balance" slight left temporal headache  · Neuro exam non focal, GCS 15, VSS  There is cervical tenderness but full ROM of cspine  · Fall risk - PT/OT eval- independent   · Case discussed with Neurosurgery-  · repeat CTH in a m  · plavix held on admission  · Patient will need repeat MRI with and without contrast after 6-8 weeks  · NIHSS 0, romberg negative, denies HA, visual changes, dizziness, chest pain, SOB    Precordial chest pain  Assessment & Plan  · CINTHYA score 4  · Troponin peak 15  · No significant EKG changes  · Hold any kind of anticoagulation due to [facility]  · Suspect most likely atypical    Aneurysm of basilar artery ([facility] [address] )  Assessment & Plan  ·  Incidental 2 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 basilar artery immediately above right SCA origin  · Discussed the case with Neurosurgery over the phone-recommends conservative management      H/o PUD (peptic ulcer disease)  Assessment & Plan  · H/o gastrostomy and ulcer repair in the past  · Tums prn    Stage 3a chronic kidney disease [facility])  Assessment & Plan  Lab Results   Component Value Date    EGFR 62 08/24/2022    EGFR 82 08/23/2022    EGFR 53 08/22/2022    CREATININE 1 27 08/24/2022    CREATININE 1 01 08/23/2022    CREATININE 1 45 (H) 08/22/2022     · CKD baseline Cr 1 3, not technically an KELVIN right now  · Cont home meds  · Other labs with possibly dehydration will give slight IVF and monitor BMP tomorrow am  · Resolved to baseline    CAD  Assessment & Plan  · H/o 4 stents  · Cont BB and statin and antihypertensive regimen  · Hold plavix in [address], await NS recs on this  · Trop wnl and EKG without abnormality on admission  · Patient complaining of 2 week history of chest pain with radiation to the left arm and numbness which also has fatigue, diaphoresis and this has been daily occurrence  Hasn't tried nitro at home for this  · OP stress test once [address] is addressed  · Will keep on tele- d/c no events  · EKG with any chest pain episodes    Low back pain  Assessment & Plan  · H/o LBP with h/o cervical disc herniation and lumbar disc herniation  · Tylenol prn, Lidoderm patch prn, robaxin prn      Type 2 diabetes mellitus with hyperglycemia, with long-term current use of insulin [facility])  Assessment & Plan  Lab Results   Component Value Date    HGBA1C 9 8 (H) 11/30/2021       Recent Labs     08/23/22  1107 08/23/22  1558 08/23/22  2119 08/24/22  0710   POCGLU 212* 132 226* 127       Blood Sugar Average: Last 72 hrs:  (P) 176 5     · Home regimen: Lantus 50 u qam, Metformin 1000 mg bid  · IP regimen: Lantus 50 u qam & Lispro ssi achs, ADA diet  · Improved control    Essential hypertension  Assessment & Plan  · Cont home meds  · BP goal < 160  · Remains controlled on home meds    Medical Problems             Resolved Problems  Date Reviewed: 8/24/2022   None               Discharging Physician / Practitioner: SWEETIE Acosta  PCP: Isabela Howell PA-C  Admission Date:   Admission Orders (From admission, onward)     Ordered        08/23/22 1139  Inpatient Admission  Once            08/22/22 1503  Place in Observation  Once                      Discharge Date: 08/24/22    Consultations During Hospital Stay:  · Neuro surgery  · PT    Procedures Performed:    8/22 CT head -   · Vague focus of hyperdensity in the inferior cerebellar vermis as above  Further evaluation with MRI without and with intravenous gadolinium is advised    MRI brain w/wo contrast:   1  Different age blood products and signal abnormality involving inferior  cerebellar vermis with small adjacent acute/subacute subarachnoid  hemorrhage  There is associated small linear (likely vascular)  enhancement  Findings correspond to the    hyperdensity seen in earlier head CT and most likely represent a recently  bled vascular anomaly such as cavernoma  Recommend 6-8 weeks  follow-up MRI without and with contrast to exclude less favored neoplastic  process     2  Nonspecific white matter change suggesting microangiopathy  CTA head:     1  Patent major vasculature of [place] without high-grade stenosis  No aneurysm or AV malformation corresponding to the abnormality seen in  the region of cerebellar vermis in earlier CT and MR       2  Incidental 2 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 basilar artery immediately  above right SCA origin  Recommend nonurgent neurovascular consultation       3   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  Developmentally hypoplastic right vertebral artery  8/24 CT head w/o:    Stable subtle hyperdensity within the cerebellar vermis posterior and inferior  to the 4th ventricle  See prior MRI brain report      Significant Findings / Test Results:   WBC 14 > 12  Creatinine 1 45 > 1 01 > 1 2    Incidental Findings:    CT head without contrast: Vague focus of hyperdensity in the inferior cerebellar vermis as above  Further evaluation with MRI without and with intravenous gadolinium is advised  ,  , I personally discussed this study with Bibi Scales on 8/22/2022 at 2:18 PM , Workstation performed: [id number]   CTA head w wo contrast: 1  Patent major vasculature of [place] without high-grade stenosis    No aneurysm or AV malformation corresponding to the abnormality seen in the region of cerebellar vermis in earlier CT and MR , 2   Incidental 2 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 basilar artery immediately above right SCA origin  Recommend nonurgent neurovascular consultation  , 3   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  Developmentally hypoplastic right vertebral artery  , The study was marked in [facility] for significant notification  , Workstation performed: [id number]   MRI brain w wo contrast: 1  Different age blood products and signal abnormality involving inferior cerebellar vermis with small adjacent acute/subacute subarachnoid hemorrhage  There is associated small linear (likely vascular) enhancement  Findings correspond to the , hyperdensity seen in earlier head CT and most likely represent a recently bled vascular anomaly such as cavernoma  Recommend 6-8 weeks follow-up MRI without and with contrast to exclude less favored neoplastic process , 2   Nonspecific white matter change suggesting microangiopathy , The study was marked in [employer] for significant notification  , Workstation performed: [id number]  ·     Test Results Pending at Discharge (will require follow up):   · none     Outpatient Tests Requested:  · MRI 6-8 weeks  · OP stress test   · CBC, BMP in 1 week    Complications:  none    Reason for Admission: [address] Course:   Nadege Carpenter is a 62 y o  male patient who originally presented to the hospital on 8/22/2022 due to dizziness for approximately 2 weeks assoicated with headaches  Pt drove himself to ER for evaluation finding small SAH in inferior cerebellar vermis with associated small linerar enhancement on MRI concerning for cavernoma with neurosurgery consulted during admission  Pt additionally reporting intermittent numbness and tingling right leg  Anticoagulation and plavix held during admission  Pt repeat Ct in AM viewed by neurosurgery and appear unchanged and stable from prior exam  At time of discharge pt denies HA, numbness/tingling, visual changes, sensory changes  NIHSS 0      Discussed with neurosurgery prior to discharge may resume Plavix tomorrow, with stable CTH  Repeat CTH is 4-6 weeks follow up 6 weeks in office  Neurosurgery  reached out to outpatient office and will call to schedule appointment with patient  Further discussed lifestyle modifications with weight loss, carbohydrate control with diabetes and high blood pressure  Recommended smoking cessation  Patient has new PCP appointment in a week  Of symptoms to present to ED for further evaluation  Please see above list of diagnoses and related plan for additional information  Condition at Discharge: stable    Discharge Day Visit / Exam:   Subjective:  Pt feeling well this morning denies all symptoms as above, no reoccurrence of PTA symptoms  Vitals: Blood Pressure: 123/71 (08/24/22 0700)  Pulse: 66 (08/24/22 0700)  Temperature: 97 7 °F (36 5 °C) (08/24/22 0700)  Temp Source: Temporal (08/24/22 0700)  Respirations: 19 (08/24/22 0700)  Weight - Scale: 111 kg (245 lb) (08/22/22 1319)  SpO2: 98 % (08/24/22 0700)  Exam:   Physical Exam  Vitals and nursing note reviewed  Constitutional:       Appearance: Normal appearance  He is well-developed  He is obese  He is not ill-appearing  HENT:      Head: Normocephalic and atraumatic  Mouth/Throat:      Mouth: Mucous membranes are moist    Eyes:      General: No scleral icterus  Extraocular Movements: Extraocular movements intact  Conjunctiva/sclera: Conjunctivae normal       Pupils: Pupils are equal, round, and reactive to light  Cardiovascular:      Rate and Rhythm: Normal rate and regular rhythm  Heart sounds: Normal heart sounds  No murmur heard  Pulmonary:      Effort: Pulmonary effort is normal  No respiratory distress  Breath sounds: Normal breath sounds  No wheezing  Abdominal:      General: Bowel sounds are normal  There is no distension  Palpations: Abdomen is soft  Tenderness: There is no abdominal tenderness  Musculoskeletal:      Cervical back: Neck supple  Right lower leg: No edema  Left lower leg: No edema  Skin:     General: Skin is warm and dry  Capillary Refill: Capillary refill takes less than 2 seconds  Neurological:      General: No focal deficit present  Mental Status: He is alert  Cranial Nerves: No cranial nerve deficit  Sensory: Sensation is intact  No sensory deficit  Motor: No weakness  Coordination: Romberg sign negative  Coordination normal  Finger-Nose-Finger Test and Heel to [place] Test normal    Psychiatric:         Mood and Affect: Mood normal          Thought Content: Thought content normal           Discussion with Family: Patient declined call to   Discharge instructions/Information to patient and family:   See after visit summary for information provided to patient and family  Provisions for Follow-Up Care:  See after visit summary for information related to follow-up care and any pertinent home health orders  Disposition:   Home    Planned Readmission: none     Discharge Statement:  I spent 45 minutes discharging the patient  This time was spent on the day of discharge  I had direct contact with the patient on the day of discharge  Greater than 50% of the total time was spent examining patient, answering all patient questions, arranging and discussing plan of care with patient as well as directly providing post-discharge instructions  Additional time then spent on discharge activities  Discharge Medications:  See after visit summary for reconciled discharge medications provided to patient and/or family        **Please Note: This note may have been constructed using a voice recognition system**

## 2022-08-24 NOTE — ASSESSMENT & PLAN NOTE
· CTH with lesion in cerebellum  · CTA H&N await rad read  · MRI brain with cerebellum vermis subacute and acute subarachnoid hemorrhage -prior provided discussed with neurosurgery  · No trauma per patient- 2 weeks of "off balance" slight left temporal headache  · Neuro exam non focal, GCS 15, VSS  There is cervical tenderness but full ROM of cspine  · Fall risk - PT/OT eval- independent   · Case discussed with Neurosurgery-  · repeat CTH in a m    · plavix held on admission  · Patient will need repeat MRI with and without contrast after 6-8 weeks  · NIHSS 0, romberg negative, denies HA, visual changes, dizziness, chest pain, SOB

## 2022-08-24 NOTE — ASSESSMENT & PLAN NOTE
· H/o 4 stents  · Cont BB and statin and antihypertensive regimen  · Hold plavix in [address], await NS recs on this  · Trop wnl and EKG without abnormality on admission  · Patient complaining of 2 week history of chest pain with radiation to the left arm and numbness which also has fatigue, diaphoresis and this has been daily occurrence  Hasn't tried nitro at home for this     · OP stress test once [address] is addressed  · Will keep on tele- d/c no events  · EKG with any chest pain episodes

## 2022-08-24 NOTE — PLAN OF CARE
Problem: Potential for Falls  Goal: Patient will remain free of falls  Description: INTERVENTIONS:  - Educate patient/family on patient safety including physical limitations  - Instruct patient to call for assistance with activity   - Consult OT/PT to assist with strengthening/mobility   - Keep Call bell within reach  - Keep bed low and locked with side rails adjusted as appropriate  - Keep care items and personal belongings within reach  - Initiate and maintain comfort rounds  - Make Fall Risk Sign visible to staff  - Offer Toileting every 2 Hours, in advance of need  - Initiate/Maintain bed alarm  - Obtain necessary fall risk management equipment: yellow socks  - Apply yellow socks and bracelet for high fall risk patients  - Consider moving patient to room near nurses station  Outcome: Progressing     Problem: PAIN - ADULT  Goal: Verbalizes/displays adequate comfort level or baseline comfort level  Description: Interventions:  - Encourage patient to monitor pain and request assistance  - Assess pain using appropriate pain scale  - Administer analgesics based on type and severity of pain and evaluate response  - Implement non-pharmacological measures as appropriate and evaluate response  - Consider cultural and social influences on pain and pain management  - Notify physician/advanced practitioner if interventions unsuccessful or patient reports new pain  Outcome: Progressing     Problem: SAFETY ADULT  Goal: Patient will remain free of falls  Description: INTERVENTIONS:  - Educate patient/family on patient safety including physical limitations  - Instruct patient to call for assistance with activity   - Consult OT/PT to assist with strengthening/mobility   - Keep Call bell within reach  - Keep bed low and locked with side rails adjusted as appropriate  - Keep care items and personal belongings within reach  - Initiate and maintain comfort rounds  - Make Fall Risk Sign visible to staff  - Offer Toileting every 2 Hours, in advance of need  - Initiate/Maintain bed alarm  - Obtain necessary fall risk management equipment: yellow socks  - Apply yellow socks and bracelet for high fall risk patients  - Consider moving patient to room near nurses station  Outcome: Progressing  Goal: Maintain or return to baseline ADL function  Description: INTERVENTIONS:  - Educate patient/family on patient safety including physical limitations  - Instruct patient to call for assistance with activity   - Consult OT/PT to assist with strengthening/mobility   - Keep Call bell within reach  - Keep bed low and locked with side rails adjusted as appropriate  - Keep care items and personal belongings within reach  - Initiate and maintain comfort rounds  - Make Fall Risk Sign visible to staff  - Offer Toileting every 2 Hours, in advance of need  - Initiate/Maintain bed alarm  - Obtain necessary fall risk management equipment: yellow socks  - Apply yellow socks and bracelet for high fall risk patients  - Consider moving patient to room near nurses station  Outcome: Progressing  Goal: Maintains/Returns to pre admission functional level  Description: INTERVENTIONS:  - Perform BMAT or MOVE assessment daily    - Set and communicate daily mobility goal to care team and patient/family/caregiver  - Collaborate with rehabilitation services on mobility goals if consulted  - Perform Range of Motion 3 times a day  - Reposition patient every 2 hours    - Dangle patient 3 times a day  - Stand patient 3 times a day  - Ambulate patient 3times a day  - Out of bed to chair 3 times a day   - Out of bed for meals 3 times a day  - Out of bed for toileting  - Record patient progress and toleration of activity level   Outcome: Progressing

## 2022-08-24 NOTE — CASE MANAGEMENT
Case Management Discharge Planning Note    Patient name Negrita De Los Santos  Location /-01 MRN [id number]  : 1965 Date 2022       Current Admission Date: 2022  Current Admission Diagnosis:SAH (subarachnoid hemorrhage) [facility])   Patient Active Problem List    Diagnosis Date Noted    Aneurysm of basilar artery ([facility] [address] ) 2022    Precordial chest pain 2022    Essential hypertension     Type 2 diabetes mellitus with hyperglycemia, with long-term current use of insulin (HCC)     SAH (subarachnoid hemorrhage) (HCC)     Low back pain     CAD     Stage 3a chronic kidney disease ([facility] [address] )     H/o PUD (peptic ulcer disease)       LOS (days): 1  Geometric Mean LOS (GMLOS) (days): 2 00  Days to GMLOS:1 1     OBJECTIVE:  Risk of Unplanned Readmission Score: 11 17         Current admission status: Inpatient   Preferred Pharmacy:   [address] [place], [address] [facility] [address] [facility]  [address] [place] [id number]  Phone: [phone] Fax: [phone]    Primary Care Provider: Antonietta Murguia PA-C    Primary Insurance: [facility] [employer]  Secondary Insurance:     DISCHARGE DETAILS:    Anticipate dc today  Spoke to patient and reviewed initial IMM with patient, he acknowledged, signed and CM provided a copy to patient  He is planning on following up with his new PCP                                                                                           IMM Given (Date):: 22  IMM Given to[de-identified] Patient

## 2022-08-24 NOTE — ASSESSMENT & PLAN NOTE
Lab Results   Component Value Date    EGFR 62 08/24/2022    EGFR 82 08/23/2022    EGFR 53 08/22/2022    CREATININE 1 27 08/24/2022    CREATININE 1 01 08/23/2022    CREATININE 1 45 (H) 08/22/2022     · CKD baseline Cr 1 3, not technically an KELVIN right now  · Cont home meds  · Other labs with possibly dehydration will give slight IVF and monitor BMP tomorrow am  · Resolved to baseline

## 2022-08-24 NOTE — TREATMENT PLAN
Neurosurgery treatment plan     History:     Per report pt is a 63 yo male with PMHx of CAD sp 4 stents, HTN, LBP, herniated cervical disc & lumbar discs, T2DM on insulin uncontrolled with CKD 3,  PUD  who presented with complaints of dizziness x 2-3 weeks as well as headaches  Pt was found to have small SAH in the inferior cerebellar vermis with associated small linear enhancement on MRI brain concerning for cavernoma, for which nsx was consulted  Pt was also noted have small 2 mm basilar aneurysm  Pt reported right leg pain and intermittent numbness that he thought was associated to the atrophy of that leg       Pt also reported chest pain that was evaluated by primary team       Per report, on exam pt was GCS 15 and remains with no focal neurological deficits     Imaging reviewed personally and by attending  · 14 [facility] 8/24/22: Stable subtle hyperdensity within the cerebellar vermis posterior and inferior to the 4th ventricle  · Mri Brain w wo 8/22/22: Different age blood products and signal abnormality involving inferior cerebellar vermis with small adjacent acute/subacute subarachnoid hemorrhage  There is associated small linear (likely vascular) enhancement  Findings correspond to the hyperdensity seen in earlier head CT and most likely represent a recently bled vascular anomaly such as cavernoma  · CTA head w wo 8/22/22: Patent major vasculature of [place] without high-grade stenosis  No aneurysm or AV malformation corresponding to the abnormality seen in the region of cerebellar vermis in earlier CT and MR  Incidental 2 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 basilar artery immediately above right SCA origin  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  Developmentally hypoplastic right vertebral artery  Assessment and Plan:     · Continue frequent neurological checks     · STAT CT head with any neurological decline or a decrease in GCS of 2pts within 1 hr   · Recommend SBP <160 mmHg  · Given stable repeat CTH, per discussion with attending Dr Bam Velazquez, okay to resume Plavix tomorrow 8/25/22  Discussed with primary team   · Medical mgt per primary team   · No neurosurgical intervention is anticipated at this time  For the suspected cavernoma, repeat MRI not required at this time as no intervention anticipated given on recurrent hemorrhage at this time  · Pt will d/c home today  Pt will have follow up with neurosurgery in approximately 4-6 weeks with repeat CTH wo for the SAH/suspected cavernoma and incidental 2 mm basilar aneurysm  · Should pt notice any new neurological findings to return to ED for evaluation   Pt to contact nsx as needed with any questions/concerns in the interim

## 2022-08-24 NOTE — ASSESSMENT & PLAN NOTE
·  Incidental 2 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 basilar artery immediately above right SCA origin  · Discussed the case with Neurosurgery over the phone-recommends conservative management

## 2022-08-24 NOTE — TELEMEDICINE
Tele Consult Note - Neurosurgery   Korey Vivas 62 y o  male MRN: [id number]  Unit/Bed#: -01 Encounter: [id number]    Consult date and time: 8/24/22  Contacted by : Dr Barraza Needs  History:    Per report pt is a 61 yo male with PMHx of CAD sp 4 stents, HTN, LBP, herniated cervical disc & lumbar discs, T2DM on insulin uncontrolled with CKD 3,  PUD  who presented with complaints of dizziness x 2-3 weeks as well as headaches  Pt was found to have small SAH in the inferior cerebellar vermis with associated small linear enhancement on MRI brain concerning for cavernoma, for which nsx was consulted  Pt reported right leg pain and intermittent numbness that he thought was associated to the atrophy of that leg  Pt also reported chest pain that was evaluated by primary team      Per report, on exam pt was GCS 15 and had no focal neurological deficits      Vital signs  /72 (BP Location: Right arm)   Pulse 64   Temp 98 4 °F (36 9 °C) (Temporal)   Resp 20   Wt 111 kg (245 lb)   SpO2 97%     Assessment and Plan:    Continue frequent neurological checks  STAT CT head with any neurological decline or a decrease in GCS of 2pts within 1 hr   Recommend SBP <160 mmHg  Hold/ avoid all antiplatelet and anticoagulation medications  Continue to hold Plavix at this time  Pain control per primary team  Mobilize and eval by PT/OT when able to  DVT PPX: SCDs only at this time  Would recommend  obtaining a stable CT head wo prior to initiation of pharmacological DVT PPX  Ongoing medical management per primary team   No neurosurgical intervention is anticipated at this time  Repeat CTH planned for tomorrow AM    Neurosurgery will review repeat CTH when completed and determine of pt may resume any AP therapy  Please call with any questions/concerns  A total time of 20 was spent reviewing patient's case, imaging and determining treatment plan

## 2022-08-24 NOTE — ASSESSMENT & PLAN NOTE
· CINTHYA score 4  · Troponin peak 15  · No significant EKG changes  · Hold any kind of anticoagulation due to [facility]  · Suspect most likely atypical

## 2022-08-24 NOTE — NURSING NOTE
Patient, along with belongings and necessary paperwork, transported to main entrance accompanied by PCA

## 2022-08-24 NOTE — PLAN OF CARE
Problem: Potential for Falls  Goal: Patient will remain free of falls  Description: INTERVENTIONS:  - Educate patient/family on patient safety including physical limitations  - Instruct patient to call for assistance with activity   - Consult OT/PT to assist with strengthening/mobility   - Keep Call bell within reach  - Keep bed low and locked with side rails adjusted as appropriate  - Keep care items and personal belongings within reach  - Initiate and maintain comfort rounds  - Make Fall Risk Sign visible to staff  - Offer Toileting every 2 Hours, in advance of need  - Initiate/Maintain bed alarm  - Obtain necessary fall risk management equipment: yellow socks  - Apply yellow socks and bracelet for high fall risk patients  - Consider moving patient to room near nurses station  8/24/2022 1603 by Sue Mooney RN  Outcome: Adequate for Discharge  8/24/2022 1019 by Sue Mooney RN  Outcome: Progressing     Problem: PAIN - ADULT  Goal: Verbalizes/displays adequate comfort level or baseline comfort level  Description: Interventions:  - Encourage patient to monitor pain and request assistance  - Assess pain using appropriate pain scale  - Administer analgesics based on type and severity of pain and evaluate response  - Implement non-pharmacological measures as appropriate and evaluate response  - Consider cultural and social influences on pain and pain management  - Notify physician/advanced practitioner if interventions unsuccessful or patient reports new pain  8/24/2022 1603 by Sue Mooney RN  Outcome: Adequate for Discharge  8/24/2022 1019 by Sue Mooney RN  Outcome: Progressing     Problem: INFECTION - ADULT  Goal: Absence or prevention of progression during hospitalization  Description: INTERVENTIONS:  - Assess and monitor for signs and symptoms of infection  - Monitor lab/diagnostic results  - Monitor all insertion sites, i e  indwelling lines, tubes, and drains  - Monitor endotracheal if appropriate and nasal secretions for changes in amount and color  - Mc[place]an appropriate cooling/warming therapies per order  - Administer medications as ordered  - Instruct and encourage patient and family to use good hand hygiene technique  - Identify and instruct in appropriate isolation precautions for identified infection/condition  8/24/2022 1603 by Mikaela Allen RN  Outcome: Adequate for Discharge  8/24/2022 1019 by Mikaela Allen RN  Outcome: Progressing  Goal: Absence of fever/infection during neutropenic period  Description: INTERVENTIONS:  - Monitor WBC    8/24/2022 1603 by Mikaela Allen RN  Outcome: Adequate for Discharge  8/24/2022 1019 by Mikaela Allen RN  Outcome: Progressing     Problem: SAFETY ADULT  Goal: Patient will remain free of falls  Description: INTERVENTIONS:  - Educate patient/family on patient safety including physical limitations  - Instruct patient to call for assistance with activity   - Consult OT/PT to assist with strengthening/mobility   - Keep Call bell within reach  - Keep bed low and locked with side rails adjusted as appropriate  - Keep care items and personal belongings within reach  - Initiate and maintain comfort rounds  - Make Fall Risk Sign visible to staff  - Offer Toileting every 2 Hours, in advance of need  - Initiate/Maintain bed alarm  - Obtain necessary fall risk management equipment: yellow socks  - Apply yellow socks and bracelet for high fall risk patients  - Consider moving patient to room near nurses station  8/24/2022 1603 by Mikaela Allen RN  Outcome: Adequate for Discharge  8/24/2022 1019 by Mikaela Allen RN  Outcome: Progressing  Goal: Maintain or return to baseline ADL function  Description: INTERVENTIONS:  - Educate patient/family on patient safety including physical limitations  - Instruct patient to call for assistance with activity   - Consult OT/PT to assist with strengthening/mobility   - Keep Call bell within reach  - Keep bed low and locked with side rails adjusted as appropriate  - Keep care items and personal belongings within reach  - Initiate and maintain comfort rounds  - Make Fall Risk Sign visible to staff  - Offer Toileting every 2 Hours, in advance of need  - Initiate/Maintain bed alarm  - Obtain necessary fall risk management equipment: yellow socks  - Apply yellow socks and bracelet for high fall risk patients  - Consider moving patient to room near nurses station  8/24/2022 1603 by Jennette Spurling, RN  Outcome: Adequate for Discharge  8/24/2022 1019 by Jennette Spurling, RN  Outcome: Progressing  Goal: Maintains/Returns to pre admission functional level  Description: INTERVENTIONS:  - Perform BMAT or MOVE assessment daily    - Set and communicate daily mobility goal to care team and patient/family/caregiver  - Collaborate with rehabilitation services on mobility goals if consulted  - Perform Range of Motion 3 times a day  - Reposition patient every 2 hours    - Dangle patient 3 times a day  - Stand patient 3 times a day  - Ambulate patient 3 times a day  - Out of bed to chair 3 times a day   - Out of bed for meals 3 times a day  - Out of bed for toileting  - Record patient progress and toleration of activity level   8/24/2022 1603 by Jennette Spurling, RN  Outcome: Adequate for Discharge  8/24/2022 1019 by Jennette Spurling, RN  Outcome: Progressing     Problem: DISCHARGE PLANNING  Goal: Discharge to home or other facility with appropriate resources  Description: INTERVENTIONS:  - Identify barriers to discharge w/patient and caregiver  - Arrange for needed discharge resources and transportation as appropriate  - Identify discharge learning needs (meds, wound care, etc )  - Arrange for interpretive services to assist at discharge as needed  - Refer to Case Management Department for coordinating discharge planning if the patient needs post-hospital services based on physician/advanced practitioner order or complex needs related to functional status, cognitive ability, or social support system  8/24/2022 1603 by Olivia Silveira RN  Outcome: Adequate for Discharge  8/24/2022 1019 by Olivia Sliveira RN  Outcome: Progressing     Problem: Knowledge Deficit  Goal: Patient/family/caregiver demonstrates understanding of disease process, treatment plan, medications, and discharge instructions  Description: Complete learning assessment and assess knowledge base  Interventions:  - Provide teaching at level of understanding  - Provide teaching via preferred learning methods  8/24/2022 1603 by Olivia Silveira RN  Outcome: Adequate for Discharge  8/24/2022 1019 by Olivia Silveira RN  Outcome: Progressing     Problem: MOBILITY - ADULT  Goal: Maintain or return to baseline ADL function  Description: INTERVENTIONS:  - Educate patient/family on patient safety including physical limitations  - Instruct patient to call for assistance with activity   - Consult OT/PT to assist with strengthening/mobility   - Keep Call bell within reach  - Keep bed low and locked with side rails adjusted as appropriate  - Keep care items and personal belongings within reach  - Initiate and maintain comfort rounds  - Make Fall Risk Sign visible to staff  - Offer Toileting every 2 Hours, in advance of need  - Initiate/Maintain bed alarm  - Obtain necessary fall risk management equipment: yellow socks  - Apply yellow socks and bracelet for high fall risk patients  - Consider moving patient to room near nurses station  8/24/2022 1603 by Olivia Silveira RN  Outcome: Adequate for Discharge  8/24/2022 1019 by Olivia Silveira RN  Outcome: Progressing  Goal: Maintains/Returns to pre admission functional level  Description: INTERVENTIONS:  - Perform BMAT or MOVE assessment daily    - Set and communicate daily mobility goal to care team and patient/family/caregiver  - Collaborate with rehabilitation services on mobility goals if consulted  - Perform Range of Motion 3 times a day    - Reposition patient every 2 hours    - Dangle patient 3 times a day  - Stand patient 3 times a day  - Ambulate patient 3 times a day  - Out of bed to chair 3 times a day   - Out of bed for meals 3 times a day  - Out of bed for toileting  - Record patient progress and toleration of activity level   8/24/2022 1603 by Jennette Spurling, RN  Outcome: Adequate for Discharge  8/24/2022 1019 by Jennette Spurling, RN  Outcome: Progressing     Problem: NEUROSENSORY - ADULT  Goal: Achieves stable or improved neurological status  Description: INTERVENTIONS  - Monitor and report changes in neurological status  - Monitor vital signs such as temperature, blood pressure, glucose, and any other labs ordered   - Initiate measures to prevent increased intracranial pressure  - Monitor for seizure activity and implement precautions if appropriate      8/24/2022 1603 by Jennette Spurling, RN  Outcome: Adequate for Discharge  8/24/2022 1019 by Jennette Spurling, RN  Outcome: Progressing  Goal: Remains free of injury related to seizures activity  Description: INTERVENTIONS  - Maintain airway, patient safety  and administer oxygen as ordered  - Monitor patient for seizure activity, document and report duration and description of seizure to physician/advanced practitioner  - If seizure occurs,  ensure patient safety during seizure  - Reorient patient post seizure  - Seizure pads on all 4 side rails  - Instruct patient/family to notify RN of any seizure activity including if an aura is experienced  - Instruct patient/family to call for assistance with activity based on nursing assessment  - Administer anti-seizure medications if ordered    8/24/2022 1603 by Jennette Spurling, RN  Outcome: Adequate for Discharge  8/24/2022 1019 by Jennette Spurling, RN  Outcome: Progressing  Goal: Achieves maximal functionality and self care  Description: INTERVENTIONS  - Monitor swallowing and airway patency with patient fatigue and changes in neurological status  - Encourage and assist patient to increase activity and self care     - Encourage visually impaired, hearing impaired and aphasic patients to use assistive/communication devices  8/24/2022 1603 by Deep De La Torre, RN  Outcome: Adequate for Discharge  8/24/2022 1019 by Deep De La Torre, RN  Outcome: Progressing

## 2022-08-24 NOTE — ASSESSMENT & PLAN NOTE
Lab Results   Component Value Date    HGBA1C 9 8 (H) 11/30/2021       Recent Labs     08/23/22  1107 08/23/22  1558 08/23/22  2119 08/24/22  0710   POCGLU 212* 132 226* 127       Blood Sugar Average: Last 72 hrs:  (P) 176 5     · Home regimen: Lantus 50 u qam, Metformin 1000 mg bid  · IP regimen: Lantus 50 u qam & Lispro ssi achs, ADA diet    · Improved control

## 2022-08-24 NOTE — UTILIZATION REVIEW
Continued Stay Review    Date: 8/24/22                           Current Patient Class: IP   Current Level of Care: ICU     HPI:57 y o  male initially admitted on 8/22/22 OBSERVATION D/T  SAH, CONVERTED TO INPATIENT 8/23/22 D/T CONTINUED NEED FOR NEURO MONITORING    Assessment/Plan: S/P 2 weeks of dizziness, with associated headaches  Small SAH in inferior cerebellar vermis with associated small linear enhancement on MRI  Intermittent numbness and tingling rt  Leg  AC held during admission  Repeat CT reviewed by neurosurgery, appears unchanged and stable from prior exam   NIHSS 0, denies HA, neurologically intact no cranial nerve deficit, no sensory deficit  F/U outpatient MRI 6-8 weeks  Hold [facility] pending neurosurgical recommendation       Vital Signs:   08/24/22 1100 97 6 °F (36 4 °C) 72 21 128/67 91 98 % -- -- None (Room air) Sitting   08/24/22 0700 97 7 °F (36 5 °C) 66 19 123/71 92 98 % -- -- None (Room air) Sitting   08/24/22 0450 98 4 °F (36 9 °C) 64 20 157/72 103 97 % -- -- None (Room air) Lying   08/24/22 0025 97 9 °F (36 6 °C) 68 19 136/63 90 99 % -- -- None (Room air) Lying   08/23/22 2130 98 °F (36 7 °C) 61 22 148/79 103 98 % -- -- None (Room air) Lying   08/23/22 1500 97 8 °F (36 6 °C) 62 22 147/70 101 97 % -- -- None (Room air) Lying   08/23/22 1100 98 6 °F (37 °C) 63 16 145/78 104 93 % -- -- None (Room air) Sitting     Date and Time Currently in Pain Pain Assessment Tool   08/24/22 1200 -- 0-10   08/24/22 0800 -- 0-10   08/24/22 0450 -- 0-10   08/24/22 0025 -- 0-10   08/23/22 2130 -- 0-10   08/23/22 2000 -- 0-10   08/23/22 1500 -- 0-10   08/23/22 1100 -- 0-10     Date and Time Eye Opening Best Verbal Response Best Motor Response Kalie Coma Scale Score   08/24/22 1200 4 5 6 15   08/24/22 0800 4 5 6 15   08/24/22 0445 4 5 6 15   08/24/22 0025 4 5 6 15   08/23/22 2000 4 5 6 15   08/23/22 1600 4 5 6 15   08/23/22 1200 4 5 6 15         Pertinent Labs/Diagnostic Results:     8/24/22 CT BRAIN - WITHOUT CONTRAST: Stable subtle hyperdensity within the cerebellar vermis posterior and inferior to the 4th ventricle   See prior MRI brain report     Results from last 7 days   Lab Units 08/24/22  0814 08/22/22  1327   WBC Thousand/uL 12 01* 14 60*   HEMOGLOBIN g/dL 13 6 12 5   HEMATOCRIT % 44 1 40 6   PLATELETS Thousands/uL 295 313   NEUTROS ABS Thousands/µL 8 53* 10 81*         Results from last 7 days   Lab Units 08/24/22  0814 08/23/22  0636 08/22/22  1327   SODIUM mmol/L 137 137 135   POTASSIUM mmol/L 3 8 3 8 3 6   CHLORIDE mmol/L 100 102 99   CO2 mmol/L 27 27 24   ANION GAP mmol/L 10 8 12   BUN mg/dL 20 17 22   CREATININE mg/dL 1 27 1 01 1 45*   EGFR ml/min/1 73sq m 62 82 53   CALCIUM mg/dL 9 0 8 0* 9 1   MAGNESIUM mg/dL  --   --  1 6     Results from last 7 days   Lab Units 08/22/22  1327   AST U/L 13   ALT U/L 23   ALK PHOS U/L 107   TOTAL PROTEIN g/dL 7 4   ALBUMIN g/dL 3 5   TOTAL BILIRUBIN mg/dL 0 35     Results from last 7 days   Lab Units 08/24/22  1102 08/24/22  0710 08/23/22  2119 08/23/22  1558 08/23/22  1107 08/23/22  0713 08/22/22  2112 08/22/22  1703 08/22/22  1329   POC GLUCOSE mg/dl 130 127 226* 132 212* 126 148* 137 304*     Results from last 7 days   Lab Units 08/24/22  0814 08/23/22  0636 08/22/22  1327   GLUCOSE RANDOM mg/dL 136 133 342*             BETA-HYDROXYBUTYRATE   Date Value Ref Range Status   08/22/2022 0 1 <0 6 mmol/L Final          Results from last 7 days   Lab Units 08/22/22  1344   PH KARAN  7 457*   PCO2 KARAN mm Hg 31 3*   PO2 KARAN mm Hg 154 3*   HCO3 KARAN mmol/L 21 6*   BASE EXC KARAN mmol/L -1 4   O2 CONTENT KARAN ml/dL 17 7   O2 HGB, VENOUS % 97 0*         Results from last 7 days   Lab Units 08/22/22  1327   CK TOTAL U/L 76     Results from last 7 days   Lab Units 08/22/22  1327   HS TNI 0HR ng/L 10         Results from last 7 days   Lab Units 08/22/22  1327   PROTIME seconds 12 9   INR  0 96   PTT seconds 27     Results from last 7 days   Lab Units 08/22/22  1327   TSH 3RD [facility] uIU/mL 0 551                     Results from last 7 days   Lab Units 08/22/22  1327   NT-PRO BNP pg/mL 127*     Results from last 7 days   Lab Units 08/22/22  1852   CLARITY UA  Clear   COLOR UA  Yellow   SPEC GRAV UA  1 020   PH UA  5 5   GLUCOSE UA mg/dl 100 (1/10%)*   KETONES UA mg/dl Negative   BLOOD UA  Negative   PROTEIN UA mg/dl Negative   NITRITE UA  Negative   BILIRUBIN UA  Negative   UROBILINOGEN UA E U /dl 0 2   LEUKOCYTES UA  Negative         Medications:   Scheduled Medications:  amLODIPine, 10 mg, Oral, Daily  atorvastatin, 80 mg, Oral, Daily With Dinner  insulin glargine, 50 Units, Subcutaneous, HS  insulin lispro, 1-6 Units, Subcutaneous, TID AC  insulin lispro, 1-6 Units, Subcutaneous, HS  lidocaine, 1 patch, Topical, Daily  lisinopril, 10 mg, Oral, Daily  metoprolol succinate, 100 mg, Oral, Daily  nicotine, 1 patch, Transdermal, Daily      Continuous IV Infusions:     PRN Meds:  acetaminophen, 650 mg, Oral, Q6H PRN  calcium carbonate, 1,000 mg, Oral, Daily PRN  HYDROmorphone, 0 5 mg, Intravenous, Q4H PRN  loperamide, 2 mg, Oral, BID PRN  meclizine, 25 mg, Oral, Q8H PRN  methocarbamol, 750 mg, Oral, Q6H PRN  ondansetron, 4 mg, Intravenous, Q6H PRN  senna, 1 tablet, Oral, HS PRN  zolpidem, 10 mg, Oral, HS PRN        Discharge Plan: [facility]     Network Utilization Review Department  ATTENTION: Please call with any questions or concerns to [phone] and carefully listen to the prompts so that you are directed to the right person  All voicemails are confidential   [facility] all requests for admission clinical reviews, approved or denied determinations and any other requests to dedicated fax number below belonging to the campus where the patient is receiving treatment   List of dedicated fax numbers for the Facilities:  [address] DENIALS (Administrative/Medical Necessity) [phone]   [address] [place] [address] (Maternity/NICU/Pediatrics) [phone]   [address] [place] [address] [facility] 40 125 [facility]  [phone]   [address] 150 Medical [place] [address] [address] [place] [address] Almas [address] [facility] [phone]

## 2022-08-24 NOTE — UTILIZATION REVIEW
Inpatient Admission Authorization Request   NOTIFICATION OF INPATIENT ADMISSION/INPATIENT AUTHORIZATION REQUEST   SERVICING FACILITY:   [address] [place] [address]  Jeremy Malone 34 [facility], [facility] , [address]  Tax ID: [id number]  NPI: [id number]  Place of Service: Inpatient [facility]  [facility]  Place of Service Code: 24     ATTENDING PROVIDER:  Attending Name and NPI#: Cynthia Vigil Md [[id number]]  Address: [facility]  Jeremy Malone [address] [place] [address], [facility] , [address]  Phone: [phone]     UTILIZATION REVIEW CONTACT:  Riya Williamson, Utilization   Network Utilization Review Department  Phone: [phone]  Fax [phone]  Email: [email or web address]     PHYSICIAN ADVISORY SERVICES:  FOR VPHY-LE-EHLH REVIEW - MEDICAL NECESSITY DENIAL  Phone: [phone]  Fax: [phone]  Email: [email or web address]     TYPE OF REQUEST:  Inpatient Status     ADMISSION INFORMATION:  ADMISSION DATE/TIME: 8/23/22 11:39 AM  PATIENT DIAGNOSIS CODE/DESCRIPTION:  Dizziness [R42]  Weakness generalized [R53 1]  Hyperglycemia [R73 9]  Chronic kidney disease [N18 9]  Cerebellar lesion [G93 9]  DISCHARGE DATE/TIME: No discharge date for patient encounter  IMPORTANT INFORMATION:  Please contact Debbie Pradhan directly with any questions or concerns regarding this request  Department voicemails are confidential     Send requests for admission clinical reviews, concurrent reviews, approvals, and administrative denials due to lack of clinical to fax [phone]

## 2022-08-25 ENCOUNTER — TELEPHONE (OUTPATIENT)
Dept: NEUROSURGERY | Facility: CLINIC | Age: 57
End: 2022-08-25

## 2022-08-25 NOTE — UTILIZATION REVIEW
Notification of Discharge   This is a Notification of Discharge from our facility [address]  Please be advised that this patient has been discharge from our facility  Below you will find the admission and discharge date and time including the patients disposition  UTILIZATION REVIEW CONTACT:  [address] [place]  Utilization   Network Utilization Review Department  Phone: [phone] x carefully listen to the prompts  All voicemails are confidential   Email: [email or web address]     PHYSICIAN ADVISORY SERVICES:  FOR VUOL-BO-OMOS REVIEW - MEDICAL NECESSITY DENIAL  Phone: [phone]  Fax: [phone]  Email: [email or web address]     PRESENTATION DATE: 8/22/2022  1:23 PM  OBERVATION ADMISSION DATE:  INPATIENT ADMISSION DATE: 8/23/22 11:39 AM   DISCHARGE DATE: 8/24/2022  4:37 PM  DISPOSITION: Home/Self Care Home/Self Care      IMPORTANT INFORMATION:  Send all requests for admission clinical reviews, approved or denied determinations and any other requests to dedicated fax number below belonging to the campus where the patient is receiving treatment   List of dedicated fax numbers:  [address] DENIALS (Administrative/Medical Necessity) [phone]   [address] (Maternity/NICU/Pediatrics) [phone]   [facility] [phone]   130 [facility] [phone]   [address] [place] [address] [phone]   [address] [place] [address],[address] [phone]   [facility]  [phone]   [address] [place] [address]  2401 [facility] 1000 [facility] [phone]

## 2022-08-25 NOTE — TELEPHONE ENCOUNTER
08/25/2022-CALLED PT, CONFIRMED 09/28/2022 SNPX W/SERA, BUT DUE TO WIFE WORK SCHEDULE RESCHEDULED APT  CONFIRMED NEW APT SNPX W/TAMICA & SERA ON 10/03/2022 IN [facility] OFFICE W/PT  PT STATES HE WILL SCHEDULE CT HEAD HIMSELF AND WAS TRANSFERRED TO  CENTRAL SCHEDULING LINE     WAITING FOR CT HEAD TO BE SCHEDULED           08/24/2022- Mehran Jesus PA-C   Completed tele noted  Please arrange 4-6 week f/u Snplx of [place] w/Dr Acacia Mcguire or [facility] with CT head wo

## 2022-09-01 ENCOUNTER — TELEPHONE (OUTPATIENT)
Dept: NEUROSURGERY | Facility: CLINIC | Age: 57
End: 2022-09-01

## 2022-09-01 NOTE — TELEPHONE ENCOUNTER
CLINICALLY APPROVED  DATE / LOCATION:    **FACILITY NOT APPROVED**  TRACKING #:  [id number]  CPT:  26314  INSURANCE:  [employer] / [employer]  PHONE:  [phone]  SPOKE TO:  Laura De La Torre ADVISING THAT Anne-Marie Agustinman IS NOT IN NETWORK WITH [employer]    THEY NEED TO CALL NUMBER ON CARD TO FIND OUT WHERE THEY CAN GO AND CALL THE OFFICE BACK WITH THAT INFORMATION**

## 2022-09-02 NOTE — TELEPHONE ENCOUNTER
I TRIED CALLING SYED (WIFE) -[phone] AND LM AGAIN FOR CALL BACK TO ADVISE THAT CAT SCAN HAS BEEN CANCELED AND WE ARE TRYING TO AUTHORIZE FOR DIFFERENT LOCATION  I ASKED FOR CALL BACK AT MY DIRECT EXTENSION

## 2022-09-02 NOTE — TELEPHONE ENCOUNTER
Teagan asked me to contact patient in Russian to explain that the CT scan was clincally approved but not for the [address] [place] [address] location or at [address] [facility] in general  Patient would have to to go to a facility in [place]  Options:  · [address]  · [facility]    1st attempt to contact this patient by phone at [phone] with the following results: maru in Russian for call back to discuss

## 2022-09-06 NOTE — TELEPHONE ENCOUNTER
PER LISA, CAT SCAN HAS BEEN APPROVED FOR ALL  LOCATIONS  WE JUST NEED TO RESCHEDULE THE CAT SCAN FOR A DIFFERENT DAY SINCE IT WAS CANCELED WITH PREVIOUS INFORMATION  I CALLED [phone] AND LM IN English FOR CALL BACK TO DISCUSS  I ALSO CALLED [phone] AND WAS ABLE TO SPEAK WITH PATIENT WHO SPEAKS ENGLISH AND ADVISED HIM OF EVERYTHING  HE VERBALIZED UNDERSTANDING  I TRANSFERRED CALL TO CENTRAL SCHEDULING TO RESCHEDULE THE CAT SCAN

## 2022-09-06 NOTE — TELEPHONE ENCOUNTER
9/13/22  AUTHORIZED BY HUMANA - 1 TIME EXCEPTION  DATE / LOCATION:  9/19/22 / [facility] #:  [phone]  VALID:   9/6/22 - 10/6/22  *SCANNED IN MEDIA*    I CALLED AND SPOKE TO [facility] HE IS AWARE OF AUTHORIZTION    9/9/22  SPOKE TO AUDIE SCHULTZ -[phone] HE ADVISED THIS CASE #[id number] IS STILL PENDING REVIEW  I CONTACTED [facility] AND EXPLAINED HE WILL CANCEL HIS 9/12/22 APPT AND RESCHEDULE TO GIVE HOWIEA TIME TO DECIDE IF THEY WILL APPROVE AN OUT OF NETWORK EXCEPTION       9/8/22:   STATUS OF OUT OF NETWORK EXCEPTION FOR IMAGING ON 9/12/22 AT [facility]  I SPOKE TO JAVIER AT [facility] #[phone] - THIS REQUEST IS STILL IN REVIEW  HE ASKED THAT I CALL BACK TOMORROW BY END OF DAY TO CHECK STATUS  I LMOM FOR PATIENT TO PLEASE CONTACT OUR OFFICE TO DISCUSS STATUS WITH HIM  OPTIONS:  1  IF NO DETERMINATION HAS BEEN MADE BY TOMORROW (9/9/22) HE CAN RESCHEDULE  Zuleima VILLARREAL CLOSER TO HIS OV[facility]  ON 10/3/22  BUT HUMANA MADE STILL DENY THIS LOCATION    2  HE CAN SCHEDULE AT [facility] OPEN MRI IN [place] (THIS IS AN IN NETWORK FACILITY ABOUT 5 MILES FROM HIM) PHONE # [phone]  RECEIVED EMAIL FROM MAX BRUNNER IN [facility]  *The below pt is scheduled for CT HEAD on 9/6  It says Amber Ravi is approved, however this pt does not have OON benefits and [facility] is nonpar with [employer]  Pt may cannot proceed at [address] [place] [address]  Please contact pt to r/s to a par facility  *       AUTH IN MEDIA / SYSTEM CAN NOT BE USED SITE WAS NEVER VERIFIED WITH HUMANA AND THIS [id number] WAS SENT PREMATURELY  SPOKE TO RIP VEGAS/ ANTOINE AT # [id number]  I AM REQUESTING A ONE TIME OUT OF NETWORK WAIVER/EXCEPTION      CLINICALS HAVE BEEN FAXED TO # [phone]  TRACKING & REFERENCE  #:  [id number]

## 2022-09-06 NOTE — TELEPHONE ENCOUNTER
Patient is scheduled for CT head on 9/12/2022 12:30 PM at [address] [facility], [facility]  Jeremy Malone [address] [place] [address], [id number]

## 2022-09-19 ENCOUNTER — TELEPHONE (OUTPATIENT)
Dept: NEUROSURGERY | Facility: CLINIC | Age: 57
End: 2022-09-19

## 2022-09-19 ENCOUNTER — HOSPITAL ENCOUNTER (OUTPATIENT)
Dept: CT IMAGING | Facility: HOSPITAL | Age: 57
Discharge: HOME | End: 2022-09-19
Payer: COMMERCIAL

## 2022-09-19 DIAGNOSIS — I60.9 SAH (SUBARACHNOID HEMORRHAGE) (HCC): ICD-10-CM

## 2022-09-19 PROCEDURE — 70450 CT HEAD/BRAIN W/O DYE: CPT

## 2022-09-19 PROCEDURE — G1004 CDSM NDSC: HCPCS

## 2022-09-19 NOTE — TELEPHONE ENCOUNTER
Call received from [facility] reporting significant findings on patient recent CT head  Will route to provider for review and assist as needed

## 2022-10-03 ENCOUNTER — OFFICE VISIT (OUTPATIENT)
Dept: NEUROSURGERY | Facility: CLINIC | Age: 57
End: 2022-10-03
Payer: COMMERCIAL

## 2022-10-03 VITALS
RESPIRATION RATE: 16 BRPM | SYSTOLIC BLOOD PRESSURE: 144 MMHG | DIASTOLIC BLOOD PRESSURE: 76 MMHG | WEIGHT: 242.6 LBS | HEIGHT: 64 IN | BODY MASS INDEX: 41.42 KG/M2 | TEMPERATURE: 98.1 F

## 2022-10-03 DIAGNOSIS — E66.01 OBESITY, MORBID (HCC): ICD-10-CM

## 2022-10-03 DIAGNOSIS — I60.9 SAH (SUBARACHNOID HEMORRHAGE) (HCC): Primary | ICD-10-CM

## 2022-10-03 PROCEDURE — 99204 OFFICE O/P NEW MOD 45 MIN: CPT | Performed by: PHYSICIAN ASSISTANT

## 2022-10-03 NOTE — ASSESSMENT & PLAN NOTE
· Patient presents for approximately 2 week follow-up for a small subarachnoid hemorrhage in the inferior cerebellar vermis with small associated linear enhancement on MRI concerning for possible cavernoma  · Pt in initially presented to the ED on August 22, 2022 complaints of off balance/dizzy for 2-3 weeks as well as headaches  · Also concern for possible 2 mm infundibulum versus aneurysm arising from the distal basilar artery  · Imaging reviewed personally and by attending  Final results below discussed with the patient  · CT head wo 08/19/2022: Slight interval involution of the hyperdense lesion in the inferior cerebellar vermis possibly related to minimal interval resorption of the hemorrhagic lesion noted previously  · [facility] wo 8/24/22: Stable subtle hyperdensity within the cerebellar vermis posterior and inferior to the 4th ventricle     · Mri Brain w wo 8/22/22: Different age blood products and signal abnormality involving inferior cerebellar vermis with small adjacent acute/subacute subarachnoid hemorrhage  Valeen Parkinson is associated small linear (likely vascular) enhancement  Findings correspond to the hyperdensity seen in earlier head CT and most likely represent a recently bled vascular anomaly such as cavernoma  · CTA head w wo 8/22/22: Patent major vasculature of [place] without high-grade stenosis   No aneurysm or AV malformation corresponding to the abnormality seen in the region of cerebellar vermis in earlier CT and MR  Concern for possible incidental 2 mm infundibulum vs aneurysm arising from distal basilar artery immediately above right SCA origin  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   Developmentally hypoplastic right vertebral artery  Plan  · Recommend SBP goal <160mmHg  Pt advised to avoid high blood pressure  · Pain/headache management with OTC pain meds/prescribed medications as needed     Discussed with patient to avoid any blood thinning medications and to avoid alcohol   Recommend safety precautions to avoid trauma to head or falls  Patient to avoid strenuous activity   Discussed with patient to return to hospital Emergency Room if they experience worsening / new headache, nausea/vomiting, speech/vision change, seizure, confusion / mental status change, weakness, or other neurological changes  · No neurosurgical intervention is anticipated at this time  Since it is unclear the origin of the hemorrhage, will recommend interval surveillance with repeat MRI brain w wo in a year  · Patient verbalized understanding and was in agreement with the plan  · Patient made aware to contact neurosurgery with any questions or concerns

## 2022-10-03 NOTE — PROGRESS NOTES
Neurosurgery Office Note  Maris Clark 62 y o  male MRN: [id number]      Assessment/Plan     SAH (subarachnoid hemorrhage) [facility])  · Patient presents for approximately 2 week follow-up for a small subarachnoid hemorrhage in the inferior cerebellar vermis with small associated linear enhancement on MRI concerning for possible cavernoma  · Pt in initially presented to the ED on August 22, 2022 complaints of off balance/dizzy for 2-3 weeks as well as headaches  · Also concern for possible 2 mm infundibulum versus aneurysm arising from the distal basilar artery  · Imaging reviewed personally and by attending  Final results below discussed with the patient  · CT head wo 08/19/2022: Slight interval involution of the hyperdense lesion in the inferior cerebellar vermis possibly related to minimal interval resorption of the hemorrhagic lesion noted previously  · [address] wo 8/24/22: Stable subtle hyperdensity within the cerebellar vermis posterior and inferior to the 4th ventricle     · Mri Brain w wo 8/22/22: Different age blood products and signal abnormality involving inferior cerebellar vermis with small adjacent acute/subacute subarachnoid hemorrhage  Landeros Gabby is associated small linear (likely vascular) enhancement  Findings correspond to the hyperdensity seen in earlier head CT and most likely represent a recently bled vascular anomaly such as cavernoma  · CTA head w wo 8/22/22: Patent major vasculature of [place] without high-grade stenosis   No aneurysm or AV malformation corresponding to the abnormality seen in the region of cerebellar vermis in earlier CT and MR  Concern for possible incidental 2 mm infundibulum vs aneurysm arising from distal basilar artery immediately above right SCA origin  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   Developmentally hypoplastic right vertebral artery  Plan  · Recommend SBP goal <160mmHg  Pt advised to avoid high blood pressure  · Pain/headache management with OTC pain meds/prescribed medications as needed   Discussed with patient to avoid any blood thinning medications and to avoid alcohol   Recommend safety precautions to avoid trauma to head or falls  Patient to avoid strenuous activity   Discussed with patient to return to hospital Emergency Room if they experience worsening / new headache, nausea/vomiting, speech/vision change, seizure, confusion / mental status change, weakness, or other neurological changes  · No neurosurgical intervention is anticipated at this time  Since it is unclear the origin of the hemorrhage, will recommend interval surveillance with repeat MRI brain w wo in a year  · Patient verbalized understanding and was in agreement with the plan  · Patient made aware to contact neurosurgery with any questions or concerns  Aneurysm of basilar artery (HCC)  · Concern for possible infundibulum versus 2 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 basilar artery as noted on CTA head 8/22/22  Plan  · Discussed with patient that this incidental finding is likely an infundibulum than an aneurysm arising from the distal basilar artery  Nonetheless discussed with pt the natural history of aneurysms including the low risk of rupture given small size of 2 mm, the risk factors for aneurysm growth and rupture, the importance of modulating risk factors including management of HTN, HLD, DM and avoiding smoking and genetic predisposition to aneurysms  · Dr Suzy Mireles discussed with pt the difference between infundibulum vs aneurysm and that it is difficult to differentiate on CTA  · Dr Suzy Mireles recommended follow up in 1 year with MRA head wo  · Pt made aware to contact nsx with any questions/concerns or new/worsening neurological changes          Diagnoses and all orders for this visit:    SAH (subarachnoid hemorrhage) ([facility] [address] )  -     MRI brain with and without contrast; Future  -     Cancel: MRV head wo contrast; Future  - MRI angiogram head without contrast; Future    Obesity, morbid ([facility] [address] )          I spent 45 minutes with the patient today in which >50% of the time was spent counseling/coordination of care regarding diagnosis, imaging review, symptoms and treatment plan  CHIEF COMPLAINT    Chief Complaint   Patient presents with    Consult     NP/ 4-6 WEEK HOSP F/U/ PER TONYA TELECONSULT/ SNPX OR RAN W/DR [employer] OR SERA/ W/CT HEAD       HISTORY    History of Present Illness     62y o  year old male     with PMHx of CAD sp 4 stents, HTN, LBP, herniated cervical disc & lumbar discs, T2DM on insulin, CKD stage 3 who presents for approximately 2 week follow-up for a small subarachnoid hemorrhage in the inferior cerebellar vermis with small associated linear enhancement on MRI concerning for possible cavernoma and concern for possible 2 mm infundibulum versus aneurysm arising from the distal basilar artery  Pt in initially presented to the ED on August 22, 2022 with complaints of being off balance/dizzy for 2-3 weeks as well as headaches  Today pt reports he continues to have intermittent headaches and dizziness but his symptoms have improved since he left the hospital  Patient also reports blurry vision that is improving  Patient denies any new numbness, tingling or weakness  Pt denies nausea or vomiting  Pt is retired  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  He also a   Pt reports he has 2 children in their 35s and one aged 2 years  Pt reports he smokes 2 cigarettes a day  REVIEW OF SYSTEMS    Review of Systems   Constitutional: Negative  HENT: Negative  Eyes: Positive for photophobia and visual disturbance (b/l blurred vision)  Respiratory: Negative  Cardiovascular: Negative  Gastrointestinal: Positive for constipation  Negative for nausea and vomiting  Endocrine: Negative  Genitourinary: Negative  Negative for frequency and urgency     Musculoskeletal: Positive for gait problem (h/o being off balance), neck pain and neck stiffness  No previous Brain Sx    No falls   Skin: Negative  Allergic/Immunologic: Negative  Neurological: Positive for dizziness (Occasionally, when going from sitting to standing), speech difficulty (More frequently as of 3 months  Frequent slurred and slow speech ), weakness (b/l R>L legs), numbness (in b/l hands) and headaches (Occurs 3x weekly and typically in occipital region  Described as as pulsating sensation  )  Negative for tremors and seizures  Hematological: Negative  Psychiatric/Behavioral: Positive for confusion (STM loss and forgetfulness), decreased concentration (trouble focusing) and sleep disturbance (chronic for years)  Meds/Allergies     Current Outpatient Medications   Medication Sig Dispense Refill    amLODIPine (NORVASC) 10 mg tablet Take 10 mg by mouth daily      atorvastatin (LIPITOR) 80 mg tablet Take 80 mg by mouth daily      clopidogrel (PLAVIX) 75 mg tablet Take 75 mg by mouth daily      insulin glargine (LANTUS) 100 units/mL subcutaneous injection Inject 50 Units under the skin daily      lisinopril (ZESTRIL) 10 mg tablet Take 10 mg by mouth daily      metFORMIN (GLUCOPHAGE) 1000 MG tablet Take 1,000 mg by mouth 2 (two) times a day with meals      metoprolol succinate (TOPROL-XL) 100 mg 24 hr tablet Take 100 mg by mouth daily      zolpidem (AMBIEN) 10 mg tablet Take 10 mg by mouth daily at bedtime as needed for sleep      methocarbamol (ROBAXIN) 750 mg tablet Take 750 mg by mouth every 6 (six) hours as needed for muscle spasms (Patient not taking: No sig reported)      nitroglycerin (NITROSTAT) 0 4 mg SL tablet Place 0 4 mg under the tongue every 5 (five) minutes as needed for chest pain (Patient not taking: No sig reported)       No current facility-administered medications for this visit         No Known Allergies    PAST HISTORY    Past Medical History:   Diagnosis Date    Diabetes mellitus ([facility] )     Hypertension     Low back pain        Past Surgical History:   Procedure Laterality Date    CORONARY STENT PLACEMENT      x4       Social History     Tobacco Use    Smoking status: Current Some Day Smoker     Packs/day: 0 25    Smokeless tobacco: Never Used   Vaping Use    Vaping Use: Never used   Substance Use Topics    Alcohol use: Not Currently    Drug use: Never       History reviewed  No pertinent family history  Above history personally reviewed  EXAM    Vitals:Blood pressure 144/76, temperature 98 1 °F (36 7 °C), temperature source Probe, resp  rate 16, height 5' 4" (1 626 m), weight 110 kg (242 lb 9 6 oz)  ,Body mass index is 41 64 kg/m²  Physical Exam  Constitutional:       Appearance: He is well-developed  HENT:      Head: Normocephalic and atraumatic  Eyes:      General: No scleral icterus  Conjunctiva/sclera: Conjunctivae normal       Pupils: Pupils are equal, round, and reactive to light  Neck:      Trachea: No tracheal deviation  Cardiovascular:      Rate and Rhythm: Normal rate  Pulmonary:      Effort: Pulmonary effort is normal    Abdominal:      Palpations: Abdomen is soft  Tenderness: There is no abdominal tenderness  There is no guarding  Musculoskeletal:      Cervical back: Neck supple  Skin:     General: Skin is warm and dry  Coloration: Skin is not pale  Findings: No rash  Neurological:      Mental Status: He is alert and oriented to person, place, and time  Comments: GCS 15, Awake, Alert, Oriented x 3    Motor: STANTON, strength 5/5 throughout    Sensation: intact to PP x 4 except right hand decreased than left and LLE decreased compared to right  Reflexes: 1+ and symmetric, no sosa's or clonus     Coordination: no drift bilateral upper extremities   Psychiatric:         Behavior: Behavior normal          Neurologic Exam     Mental Status   Oriented to person, place, and time       Cranial Nerves     CN III, IV, VI Pupils are equal, round, and reactive to light  MEDICAL DECISION MAKING    Imaging Studies:     CT head wo contrast    Result Date: 9/19/2022  Narrative: CT BRAIN - WITHOUT CONTRAST INDICATION:   I60 9: Nontraumatic subarachnoid hemorrhage, unspecified  COMPARISON:  8/24/2022; 8/22/2022 TECHNIQUE:  CT examination of the brain was performed  In addition to axial images, sagittal and coronal 2D reformatted images were created and submitted for interpretation  Radiation dose length product (DLP) for this visit:  862 mGy-cm   This examination, like all CT scans performed in the [facility], was performed utilizing techniques to minimize radiation dose exposure, including the use of iterative reconstruction and automated exposure control  IMAGE QUALITY:  Diagnostic  FINDINGS: PARENCHYMA:  Lobulated hyperdense lesion in the inferior cerebellar vermis is slightly less conspicuous on the current study compared to the prior head CTs, seen on series 2, image 9  There is slightly diminished effacement of the 4th ventricle  No hydrocephalus  No new hyperdense abnormality  A few scattered white matter hypodensities in the periventricular brain are stable  VENTRICLES AND EXTRA-AXIAL SPACES:  Normal for the patient's age  VISUALIZED ORBITS AND PARANASAL SINUSES:  Unremarkable  CALVARIUM AND EXTRACRANIAL SOFT TISSUES:  Normal      Impression: 1  Slight interval involution of the hyperdense lesion in the inferior cerebellar vermis possibly related to minimal interval resorption of the hemorrhagic lesion noted previously  Recommend follow-up contrast enhanced MRI of the brain in 3-6 months to  assess for evolution /exclude neoplastic process  As suggested on the previous MRI from 8/22/2022, findings may be related to a cavernous angioma (cavernoma) which previously hemorrhaged  2   No acute intracranial hemorrhage   3   A few white matter lesions are nonspecific but 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

## 2022-10-03 NOTE — ASSESSMENT & PLAN NOTE
· Concern for possible infundibulum versus 2 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 basilar artery as noted on CTA head 8/22/22  Plan  · Discussed with patient that this incidental finding is likely an infundibulum than an aneurysm arising from the distal basilar artery  Nonetheless discussed with pt the natural history of aneurysms including the low risk of rupture given small size of 2 mm, the risk factors for aneurysm growth and rupture, the importance of modulating risk factors including management of HTN, HLD, DM and avoiding smoking and genetic predisposition to aneurysms  · Dr Haley Bahena discussed with pt the difference between infundibulum vs aneurysm and that it is difficult to differentiate on CTA  · Dr Haley Bahena recommended follow up in 1 year with MRA head wo  · Pt made aware to contact nsx with any questions/concerns or new/worsening neurological changes

## 2023-09-06 ENCOUNTER — TELEPHONE (OUTPATIENT)
Dept: NEUROSURGERY | Facility: CLINIC | Age: 58
End: 2023-09-06

## 2023-09-06 NOTE — TELEPHONE ENCOUNTER
Per Teagan WATSON  Request, patient was called and instructed to c/b to discuss where he had his Brain MRI which was done in August. Direct ext provided

## 2023-09-07 NOTE — TELEPHONE ENCOUNTER
9/7/23:   SPOKE TO ERICBERTO CANCELED IMAGING AND APPOINTMENT AT HIS REQUEST - HE IS FOLLOWING WITH [facility].